# Patient Record
Sex: MALE | Race: BLACK OR AFRICAN AMERICAN | NOT HISPANIC OR LATINO | Employment: FULL TIME | URBAN - METROPOLITAN AREA
[De-identification: names, ages, dates, MRNs, and addresses within clinical notes are randomized per-mention and may not be internally consistent; named-entity substitution may affect disease eponyms.]

---

## 2017-08-29 LAB
BILIRUB SERPL-MCNC: NEGATIVE MG/DL
BLOOD URINE, POC: NORMAL
CLARITY, POC UA: CLEAR
COLOR, POC UA: YELLOW
GLUCOSE UR QL STRIP: NEGATIVE
KETONES UR QL STRIP: NEGATIVE
LEUKOCYTE EST, POC UA: NEGATIVE
NITRITE, POC UA: NEGATIVE
PH, POC UA: 7
PROTEIN, POC: NORMAL
SPECIFIC GRAVITY, POC UA: 1.01
UROBILINOGEN, POC UA: NORMAL

## 2018-02-05 ENCOUNTER — HISTORICAL (OUTPATIENT)
Dept: LAB | Facility: HOSPITAL | Age: 53
End: 2018-02-05

## 2018-02-05 LAB
ABS NEUT (OLG): 3.13 X10(3)/MCL (ref 2.1–9.2)
ALBUMIN SERPL-MCNC: 3.5 GM/DL (ref 3.4–5)
ALBUMIN/GLOB SERPL: 0.9 {RATIO}
ALP SERPL-CCNC: 73 UNIT/L (ref 50–136)
ALT SERPL-CCNC: 21 UNIT/L (ref 12–78)
AST SERPL-CCNC: 13 UNIT/L (ref 15–37)
BASOPHILS # BLD AUTO: 0.1 X10(3)/MCL (ref 0–0.2)
BASOPHILS NFR BLD AUTO: 1 %
BILIRUB SERPL-MCNC: 0.6 MG/DL (ref 0.2–1)
BILIRUBIN DIRECT+TOT PNL SERPL-MCNC: 0.2 MG/DL (ref 0–0.2)
BILIRUBIN DIRECT+TOT PNL SERPL-MCNC: 0.4 MG/DL (ref 0–0.8)
BUN SERPL-MCNC: 6 MG/DL (ref 7–18)
CALCIUM SERPL-MCNC: 8.7 MG/DL (ref 8.5–10.1)
CHLORIDE SERPL-SCNC: 105 MMOL/L (ref 98–107)
CO2 SERPL-SCNC: 30 MMOL/L (ref 21–32)
CREAT SERPL-MCNC: 1.02 MG/DL (ref 0.7–1.3)
EOSINOPHIL # BLD AUTO: 0 X10(3)/MCL (ref 0–0.9)
EOSINOPHIL NFR BLD AUTO: 1 %
ERYTHROCYTE [DISTWIDTH] IN BLOOD BY AUTOMATED COUNT: 14.3 % (ref 11.5–17)
GLOBULIN SER-MCNC: 4.1 GM/DL (ref 2.4–3.5)
GLUCOSE SERPL-MCNC: 150 MG/DL (ref 74–106)
HCT VFR BLD AUTO: 41.6 % (ref 42–52)
HGB BLD-MCNC: 13.1 GM/DL (ref 14–18)
LYMPHOCYTES # BLD AUTO: 1 X10(3)/MCL (ref 0.6–4.6)
LYMPHOCYTES NFR BLD AUTO: 20 %
MCH RBC QN AUTO: 26.4 PG (ref 27–31)
MCHC RBC AUTO-ENTMCNC: 31.5 GM/DL (ref 33–36)
MCV RBC AUTO: 83.7 FL (ref 80–94)
MONOCYTES # BLD AUTO: 0.5 X10(3)/MCL (ref 0.1–1.3)
MONOCYTES NFR BLD AUTO: 10 %
NEUTROPHILS # BLD AUTO: 3.13 X10(3)/MCL (ref 1.4–7.9)
NEUTROPHILS NFR BLD AUTO: 67 %
PLATELET # BLD AUTO: 159 X10(3)/MCL (ref 130–400)
PMV BLD AUTO: 11 FL (ref 9.4–12.4)
POTASSIUM SERPL-SCNC: 3.8 MMOL/L (ref 3.5–5.1)
PROT SERPL-MCNC: 7.6 GM/DL (ref 6.4–8.2)
PSA SERPL-MCNC: 1.31 NG/ML (ref 0–4)
RBC # BLD AUTO: 4.97 X10(6)/MCL (ref 4.7–6.1)
SODIUM SERPL-SCNC: 139 MMOL/L (ref 136–145)
WBC # SPEC AUTO: 4.7 X10(3)/MCL (ref 4.5–11.5)

## 2018-09-03 ENCOUNTER — HOSPITAL ENCOUNTER (OUTPATIENT)
Dept: NUTRITION | Facility: HOSPITAL | Age: 53
End: 2018-09-05
Attending: INTERNAL MEDICINE | Admitting: INTERNAL MEDICINE

## 2018-09-03 LAB
ABS NEUT (OLG): 3.74 X10(3)/MCL (ref 2.1–9.2)
ALBUMIN SERPL-MCNC: 3.5 GM/DL (ref 3.4–5)
ALBUMIN/GLOB SERPL: 0.8 RATIO (ref 1.1–2)
ALP SERPL-CCNC: 266 UNIT/L (ref 50–136)
ALT SERPL-CCNC: 161 UNIT/L (ref 12–78)
APPEARANCE, UA: CLEAR
AST SERPL-CCNC: 81 UNIT/L (ref 15–37)
BACTERIA SPEC CULT: ABNORMAL /HPF
BASOPHILS # BLD AUTO: 0.1 X10(3)/MCL (ref 0–0.2)
BASOPHILS NFR BLD AUTO: 1 %
BILIRUB SERPL-MCNC: 1.1 MG/DL (ref 0.2–1)
BILIRUB UR QL STRIP: NEGATIVE
BILIRUBIN DIRECT+TOT PNL SERPL-MCNC: 0.4 MG/DL (ref 0–0.5)
BILIRUBIN DIRECT+TOT PNL SERPL-MCNC: 0.7 MG/DL (ref 0–0.8)
BUN SERPL-MCNC: 11 MG/DL (ref 7–18)
CALCIUM SERPL-MCNC: 9.5 MG/DL (ref 8.5–10.1)
CHLORIDE SERPL-SCNC: 98 MMOL/L (ref 98–107)
CO2 SERPL-SCNC: 28 MMOL/L (ref 21–32)
COLOR UR: YELLOW
CREAT SERPL-MCNC: 1.22 MG/DL (ref 0.7–1.3)
EOSINOPHIL # BLD AUTO: 0.1 X10(3)/MCL (ref 0–0.9)
EOSINOPHIL NFR BLD AUTO: 1 %
ERYTHROCYTE [DISTWIDTH] IN BLOOD BY AUTOMATED COUNT: 11.7 % (ref 11.5–17)
EST. AVERAGE GLUCOSE BLD GHB EST-MCNC: 292 MG/DL
GLOBULIN SER-MCNC: 4.2 GM/DL (ref 2.4–3.5)
GLUCOSE (UA): ABNORMAL
GLUCOSE SERPL-MCNC: 456 MG/DL (ref 74–106)
HAV IGM SERPL QL IA: NEGATIVE
HBA1C MFR BLD: 11.8 % (ref 4.2–6.3)
HBV CORE IGM SERPL QL IA: NEGATIVE
HBV SURFACE AG SERPL QL IA: NEGATIVE
HCT VFR BLD AUTO: 44.3 % (ref 42–52)
HCV AB SERPL QL IA: NEGATIVE
HEPATITIS PANEL INTERP: NORMAL
HGB BLD-MCNC: 14.5 GM/DL (ref 14–18)
HGB UR QL STRIP: NEGATIVE
KETONES UR QL STRIP: ABNORMAL
LEUKOCYTE ESTERASE UR QL STRIP: NEGATIVE
LYMPHOCYTES # BLD AUTO: 0.8 X10(3)/MCL (ref 0.6–4.6)
LYMPHOCYTES NFR BLD AUTO: 15 %
MCH RBC QN AUTO: 26.9 PG (ref 27–31)
MCHC RBC AUTO-ENTMCNC: 32.7 GM/DL (ref 33–36)
MCV RBC AUTO: 82.2 FL (ref 80–94)
MONOCYTES # BLD AUTO: 0.5 X10(3)/MCL (ref 0.1–1.3)
MONOCYTES NFR BLD AUTO: 10 %
NEUTROPHILS # BLD AUTO: 3.74 X10(3)/MCL (ref 1.4–7.9)
NEUTROPHILS NFR BLD AUTO: 72 %
NITRITE UR QL STRIP: NEGATIVE
PH UR STRIP: 5 [PH] (ref 5–9)
PLATELET # BLD AUTO: 137 X10(3)/MCL (ref 130–400)
PMV BLD AUTO: 13.1 FL (ref 9.4–12.4)
POTASSIUM SERPL-SCNC: 4.2 MMOL/L (ref 3.5–5.1)
PROT SERPL-MCNC: 7.7 GM/DL (ref 6.4–8.2)
PROT UR QL STRIP: NEGATIVE
RBC # BLD AUTO: 5.39 X10(6)/MCL (ref 4.7–6.1)
RBC #/AREA URNS HPF: ABNORMAL /[HPF]
RESTICK: NORMAL
SODIUM SERPL-SCNC: 135 MMOL/L (ref 136–145)
SP GR UR STRIP: 1.03 (ref 1–1.03)
SQUAMOUS EPITHELIAL, UA: ABNORMAL
TROPONIN I SERPL-MCNC: <0.02 NG/ML (ref 0.02–0.49)
UROBILINOGEN UR STRIP-ACNC: 0.2
WBC # SPEC AUTO: 5.2 X10(3)/MCL (ref 4.5–11.5)
WBC #/AREA URNS HPF: ABNORMAL /[HPF]

## 2018-09-04 LAB
ABS NEUT (OLG): 2.25 X10(3)/MCL (ref 2.1–9.2)
BASOPHILS NFR BLD MANUAL: 1 % (ref 0–2)
BUN SERPL-MCNC: 8 MG/DL (ref 7–18)
CALCIUM SERPL-MCNC: 9 MG/DL (ref 8.5–10.1)
CHLORIDE SERPL-SCNC: 106 MMOL/L (ref 98–107)
CO2 SERPL-SCNC: 28 MMOL/L (ref 21–32)
CREAT SERPL-MCNC: 0.84 MG/DL (ref 0.7–1.3)
CREAT/UREA NIT SERPL: 9.5
EOSINOPHIL NFR BLD MANUAL: 6 % (ref 0–8)
ERYTHROCYTE [DISTWIDTH] IN BLOOD BY AUTOMATED COUNT: 11.9 % (ref 11.5–17)
GLUCOSE SERPL-MCNC: 190 MG/DL (ref 74–106)
HCT VFR BLD AUTO: 42.6 % (ref 42–52)
HGB BLD-MCNC: 13.4 GM/DL (ref 14–18)
LYMPHOCYTES NFR BLD MANUAL: 19 % (ref 13–40)
MCH RBC QN AUTO: 26.8 PG (ref 27–31)
MCHC RBC AUTO-ENTMCNC: 31.5 GM/DL (ref 33–36)
MCV RBC AUTO: 85.2 FL (ref 80–94)
MONOCYTES NFR BLD MANUAL: 13 % (ref 2–11)
NEUTROPHILS NFR BLD MANUAL: 61 % (ref 47–80)
PLATELET # BLD AUTO: 115 X10(3)/MCL (ref 130–400)
PLATELET # BLD EST: ABNORMAL 10*3/UL
PMV BLD AUTO: 12.6 FL (ref 7.4–10.4)
POTASSIUM SERPL-SCNC: 3.7 MMOL/L (ref 3.5–5.1)
RBC # BLD AUTO: 5 X10(6)/MCL (ref 4.7–6.1)
SODIUM SERPL-SCNC: 142 MMOL/L (ref 136–145)
WBC # SPEC AUTO: 3.8 X10(3)/MCL (ref 4.5–11.5)

## 2018-09-05 LAB
ALBUMIN SERPL-MCNC: 2.8 GM/DL (ref 3.4–5)
ALBUMIN/GLOB SERPL: 0.8 {RATIO}
ALP SERPL-CCNC: 203 UNIT/L (ref 50–136)
ALT SERPL-CCNC: 105 UNIT/L (ref 12–78)
AST SERPL-CCNC: 53 UNIT/L (ref 15–37)
BILIRUB SERPL-MCNC: 0.8 MG/DL (ref 0.2–1)
BILIRUBIN DIRECT+TOT PNL SERPL-MCNC: 0.3 MG/DL (ref 0–0.2)
BILIRUBIN DIRECT+TOT PNL SERPL-MCNC: 0.5 MG/DL (ref 0–0.8)
BUN SERPL-MCNC: 6 MG/DL (ref 7–18)
CALCIUM SERPL-MCNC: 8.9 MG/DL (ref 8.5–10.1)
CHLORIDE SERPL-SCNC: 107 MMOL/L (ref 98–107)
CO2 SERPL-SCNC: 28 MMOL/L (ref 21–32)
CREAT SERPL-MCNC: 0.74 MG/DL (ref 0.7–1.3)
GGT SERPL-CCNC: 586 UNIT/L (ref 5–85)
GLOBULIN SER-MCNC: 3.4 GM/DL (ref 2.4–3.5)
GLUCOSE SERPL-MCNC: 222 MG/DL (ref 74–106)
POTASSIUM SERPL-SCNC: 3.4 MMOL/L (ref 3.5–5.1)
PROT SERPL-MCNC: 6.2 GM/DL (ref 6.4–8.2)
SODIUM SERPL-SCNC: 141 MMOL/L (ref 136–145)

## 2018-10-05 ENCOUNTER — HISTORICAL (OUTPATIENT)
Dept: LAB | Facility: HOSPITAL | Age: 53
End: 2018-10-05

## 2021-04-23 LAB — SARS-COV-2 RNA RESP QL NAA+PROBE: POSITIVE

## 2021-04-26 ENCOUNTER — HISTORICAL (OUTPATIENT)
Dept: INFECTIOUS DISEASES | Facility: HOSPITAL | Age: 56
End: 2021-04-26

## 2021-10-09 LAB
BILIRUB SERPL-MCNC: NEGATIVE MG/DL
BLOOD URINE, POC: NEGATIVE
CLARITY, POC UA: CLEAR
COLOR, POC UA: YELLOW
KETONES UR QL STRIP: NEGATIVE
LEUKOCYTE EST, POC UA: NEGATIVE
NITRITE, POC UA: NEGATIVE
PH, POC UA: 6.5
PROTEIN, POC: NEGATIVE
SPECIFIC GRAVITY, POC UA: 1.01

## 2021-12-14 LAB
INFLUENZA A ANTIGEN, POC: NEGATIVE
INFLUENZA B ANTIGEN, POC: NEGATIVE
RAPID GROUP A STREP (OHS): NEGATIVE
SARS-COV-2 RNA RESP QL NAA+PROBE: NEGATIVE

## 2022-04-10 ENCOUNTER — HISTORICAL (OUTPATIENT)
Dept: ADMINISTRATIVE | Facility: HOSPITAL | Age: 57
End: 2022-04-10
Payer: COMMERCIAL

## 2022-04-30 VITALS
OXYGEN SATURATION: 96 % | SYSTOLIC BLOOD PRESSURE: 106 MMHG | DIASTOLIC BLOOD PRESSURE: 71 MMHG | BODY MASS INDEX: 30.77 KG/M2 | WEIGHT: 214.94 LBS | HEIGHT: 70 IN

## 2022-05-08 ENCOUNTER — HOSPITAL ENCOUNTER (EMERGENCY)
Facility: HOSPITAL | Age: 57
Discharge: HOME | End: 2022-05-08
Attending: EMERGENCY MEDICINE
Payer: COMMERCIAL

## 2022-05-08 VITALS
RESPIRATION RATE: 16 BRPM | DIASTOLIC BLOOD PRESSURE: 81 MMHG | WEIGHT: 203 LBS | TEMPERATURE: 99 F | HEIGHT: 70 IN | SYSTOLIC BLOOD PRESSURE: 130 MMHG | BODY MASS INDEX: 29.06 KG/M2 | OXYGEN SATURATION: 99 % | HEART RATE: 76 BPM

## 2022-05-08 DIAGNOSIS — R13.10 DYSPHAGIA, UNSPECIFIED TYPE: Primary | ICD-10-CM

## 2022-05-08 PROCEDURE — 25000003 PHARM REV CODE 250: Performed by: PHYSICIAN ASSISTANT

## 2022-05-08 PROCEDURE — 96375 TX/PRO/DX INJ NEW DRUG ADDON: CPT

## 2022-05-08 PROCEDURE — 63600175 PHARM REV CODE 636 W HCPCS: Mod: JG | Performed by: NURSE PRACTITIONER

## 2022-05-08 PROCEDURE — 96374 THER/PROPH/DIAG INJ IV PUSH: CPT

## 2022-05-08 PROCEDURE — 99284 EMERGENCY DEPT VISIT MOD MDM: CPT | Mod: 25

## 2022-05-08 RX ORDER — GLUCAGON 1 MG
1 KIT INJECTION
Status: COMPLETED | OUTPATIENT
Start: 2022-05-08 | End: 2022-05-08

## 2022-05-08 RX ORDER — FAMOTIDINE 10 MG/ML
40 INJECTION INTRAVENOUS
Status: COMPLETED | OUTPATIENT
Start: 2022-05-08 | End: 2022-05-08

## 2022-05-08 RX ADMIN — GLUCAGON HYDROCHLORIDE 1 MG: KIT at 05:05

## 2022-05-08 RX ADMIN — FAMOTIDINE 40 MG: 10 INJECTION, SOLUTION INTRAVENOUS at 06:05

## 2022-05-08 NOTE — ED PROVIDER NOTES
Encounter Date: 5/8/2022       History     Chief Complaint   Patient presents with    Choking     Difficulty swallowing x2 days. Able to swallow secretions, but otherwise unable to keep anything down. States feels like it gets stuck and then it comes back up.  Seen at [facility] and told he may need a scope yesterday.     57 yomale who presents with feeling as though he has something stuck when he tries to eats and drinks. States symptoms started Thursday but then worsened Friday. States he cannot tolerate water or food because it won't go down and throwing it up  He is able to tolerate his own saliva. Patient reports to being seen at  yesterday where placed on acid reflux medication. Did not  or start. Follows with Dr. Trinh but has not had this issue before.            Review of patient's allergies indicates:  No Known Allergies  Past Medical History:   Diagnosis Date    Diabetes mellitus     Pancreatitis      Past Surgical History:   Procedure Laterality Date    CHOLECYSTECTOMY         Social History     Tobacco Use    Smoking status: Never Smoker   Substance Use Topics    Alcohol use: No    Drug use: No     Review of Systems   Constitutional: Negative for chills, fatigue and fever.   HENT: Negative for rhinorrhea and sore throat.    Respiratory: Negative for cough, shortness of breath and wheezing.    Cardiovascular: Negative for chest pain.   Gastrointestinal: Positive for abdominal pain and vomiting. Negative for nausea.   Genitourinary: Negative for dysuria, flank pain, frequency, hematuria and urgency.   Musculoskeletal: Negative for back pain.   Skin: Negative for rash.   Neurological: Negative for dizziness, syncope and weakness.   Hematological: Does not bruise/bleed easily.   All other systems reviewed and are negative.      Physical Exam     Initial Vitals [05/08/22 1424]   BP Pulse Resp Temp SpO2   111/72 84 18 98.6 °F (37 °C) 97 %      MAP       --         Physical  Exam    Nursing note and vitals reviewed.  Constitutional: He appears well-developed and well-nourished. No distress.   HENT:   Head: Normocephalic and atraumatic.   Eyes: Conjunctivae and EOM are normal. Pupils are equal, round, and reactive to light.   Neck: Neck supple.   Normal range of motion.  Cardiovascular: Normal rate, regular rhythm and normal heart sounds.   Pulmonary/Chest: Breath sounds normal. No respiratory distress. He has no wheezes. He has no rhonchi. He has no rales.   Abdominal: Abdomen is soft. Bowel sounds are normal. There is no abdominal tenderness.   Musculoskeletal:         General: Normal range of motion.      Cervical back: Normal range of motion and neck supple.     Neurological: He is alert and oriented to person, place, and time. GCS score is 15. GCS eye subscore is 4. GCS verbal subscore is 5. GCS motor subscore is 6.   Skin: Skin is warm and dry.   Psychiatric: He has a normal mood and affect.         ED Course   Procedures  Labs Reviewed - No data to display       Imaging Results    None          Medications   glucagon (human recombinant) injection 1 mg (1 mg Intravenous Given by Other 5/8/22 1740)   famotidine (PF) injection 40 mg (40 mg Intravenous Given 5/8/22 1805)                 ED Course as of 05/09/22 0054   Sun May 08, 2022   1827 Patient tolerating PO challenge after IV medications. Patient already started on Acid reflux medication yesterday. Will have f/u with Dr. Trinh.  [SL]      ED Course User Index  [SL] JING Fernandez             Clinical Impression:   Final diagnoses:  [R13.10] Dysphagia, unspecified type (Primary)          ED Disposition Condition    Discharge Stable        ED Prescriptions     None        Follow-up Information     Follow up With Specialties Details Why Contact Info    PCP  In 1 week As needed            JING Fernandez  05/09/22 0054

## 2022-05-08 NOTE — FIRST PROVIDER EVALUATION
Medical screening exam completed.  I have conducted a focused provider triage encounter, findings are as follows:    Chief Complaint   Patient presents with    Choking     Difficulty swallowing x2 days. Able to swallow secretions, but otherwise unable to keep anything down. States feels like it gets stuck and then it comes back up.  Seen at [facility] and told he may need a scope yesterday.       Brief history of present illness:  58 y/o male who presents with feeling as though he has something stuck when he tries to eat/drink. Symptoms originally started Thursday but then worsened Friday. States he can't tolerate water or food because it won't go down. He is able to tolerate his own saliva. He does see dr. hodge but has not had this issue before.     There were no vitals filed for this visit.    Pertinent physical exam:  Tolerated his own secretions, alert, no distress, nonlabored respirations, ambulatory steadily    Brief workup plan:  Glucagon IVP    Preliminary workup initiated; this workup will be continued and followed by the physician or advanced practice provider that is assigned to the patient when roomed.

## 2022-06-14 ENCOUNTER — ANESTHESIA EVENT (OUTPATIENT)
Dept: ENDOSCOPY | Facility: HOSPITAL | Age: 57
DRG: 405 | End: 2022-06-14
Payer: COMMERCIAL

## 2022-06-14 ENCOUNTER — ANESTHESIA (OUTPATIENT)
Dept: ENDOSCOPY | Facility: HOSPITAL | Age: 57
DRG: 405 | End: 2022-06-14
Payer: COMMERCIAL

## 2022-06-14 ENCOUNTER — HOSPITAL ENCOUNTER (INPATIENT)
Facility: HOSPITAL | Age: 57
LOS: 6 days | Discharge: HOME | DRG: 405 | End: 2022-06-22
Attending: EMERGENCY MEDICINE | Admitting: INTERNAL MEDICINE
Payer: COMMERCIAL

## 2022-06-14 DIAGNOSIS — R13.19 ESOPHAGEAL DYSPHAGIA: Chronic | ICD-10-CM

## 2022-06-14 DIAGNOSIS — R13.10 DYSPHAGIA, UNSPECIFIED TYPE: ICD-10-CM

## 2022-06-14 DIAGNOSIS — K86.1 OTHER CHRONIC PANCREATITIS: ICD-10-CM

## 2022-06-14 DIAGNOSIS — K86.2 CYST AND PSEUDOCYST OF PANCREAS: ICD-10-CM

## 2022-06-14 DIAGNOSIS — K86.3 CYST AND PSEUDOCYST OF PANCREAS: ICD-10-CM

## 2022-06-14 DIAGNOSIS — E86.0 DEHYDRATION: Primary | ICD-10-CM

## 2022-06-14 LAB
ABS NEUT (OLG): 3.85 X10(3)/MCL (ref 2.1–9.2)
ALBUMIN SERPL-MCNC: 3.8 GM/DL (ref 3.5–5)
ALBUMIN/GLOB SERPL: 0.8 RATIO (ref 1.1–2)
ALP SERPL-CCNC: 62 UNIT/L (ref 40–150)
ALT SERPL-CCNC: 7 UNIT/L (ref 0–55)
APPEARANCE UR: CLEAR
AST SERPL-CCNC: 18 UNIT/L (ref 5–34)
BACTERIA #/AREA URNS AUTO: NORMAL /HPF
BASOPHILS NFR BLD MANUAL: 0.21 X10(3)/MCL (ref 0–0.2)
BASOPHILS NFR BLD MANUAL: 4 %
BILIRUB UR QL STRIP.AUTO: NEGATIVE MG/DL
BILIRUBIN DIRECT+TOT PNL SERPL-MCNC: 1.2 MG/DL
BUN SERPL-MCNC: 15.9 MG/DL (ref 8.4–25.7)
CALCIUM SERPL-MCNC: 9.3 MG/DL (ref 8.4–10.2)
CHLORIDE SERPL-SCNC: 106 MMOL/L (ref 98–107)
CO2 SERPL-SCNC: 19 MMOL/L (ref 22–29)
COLOR UR AUTO: YELLOW
CREAT SERPL-MCNC: 0.86 MG/DL (ref 0.73–1.18)
EOSINOPHIL NFR BLD MANUAL: 0.05 X10(3)/MCL (ref 0–0.9)
EOSINOPHIL NFR BLD MANUAL: 1 %
ERYTHROCYTE [DISTWIDTH] IN BLOOD BY AUTOMATED COUNT: 13.5 % (ref 11.5–17)
GLOBULIN SER-MCNC: 4.8 GM/DL (ref 2.4–3.5)
GLUCOSE SERPL-MCNC: 118 MG/DL (ref 74–100)
GLUCOSE UR QL STRIP.AUTO: ABNORMAL MG/DL
HCT VFR BLD AUTO: 44.4 % (ref 42–52)
HGB BLD-MCNC: 14.4 GM/DL (ref 14–18)
IMM GRANULOCYTES # BLD AUTO: 0.02 X10(3)/MCL (ref 0–0.02)
IMM GRANULOCYTES NFR BLD AUTO: 0.4 % (ref 0–0.43)
INSTRUMENT WBC (OLG): 5.2 X10(3)/MCL
KETONES UR QL STRIP.AUTO: ABNORMAL MG/DL
LEUKOCYTE ESTERASE UR QL STRIP.AUTO: NEGATIVE UNIT/L
LYMPHOCYTES NFR BLD MANUAL: 0.52 X10(3)/MCL
LYMPHOCYTES NFR BLD MANUAL: 10 %
MCH RBC QN AUTO: 26.6 PG (ref 27–31)
MCHC RBC AUTO-ENTMCNC: 32.4 MG/DL (ref 33–36)
MCV RBC AUTO: 82.1 FL (ref 80–94)
MONOCYTES NFR BLD MANUAL: 0.57 X10(3)/MCL (ref 0.1–1.3)
MONOCYTES NFR BLD MANUAL: 11 %
NEUTROPHILS NFR BLD MANUAL: 74 %
NITRITE UR QL STRIP.AUTO: NEGATIVE
NRBC BLD AUTO-RTO: 0 %
PH UR STRIP.AUTO: 5.5 [PH]
PLATELET # BLD AUTO: 203 X10(3)/MCL (ref 130–400)
PLATELET # BLD EST: ADEQUATE 10*3/UL
PMV BLD AUTO: 12.3 FL (ref 9.4–12.4)
POCT GLUCOSE: 101 MG/DL (ref 70–110)
POCT GLUCOSE: 130 MG/DL (ref 70–110)
POTASSIUM SERPL-SCNC: 4.2 MMOL/L (ref 3.5–5.1)
PROT SERPL-MCNC: 8.6 GM/DL (ref 6.4–8.3)
PROT UR QL STRIP.AUTO: ABNORMAL MG/DL
RBC # BLD AUTO: 5.41 X10(6)/MCL (ref 4.7–6.1)
RBC #/AREA URNS AUTO: <5 /HPF
RBC MORPH BLD: NORMAL
RBC UR QL AUTO: NEGATIVE UNIT/L
SODIUM SERPL-SCNC: 138 MMOL/L (ref 136–145)
SP GR UR STRIP.AUTO: 1.02 (ref 1–1.03)
SQUAMOUS #/AREA URNS AUTO: <4 /LPF
UROBILINOGEN UR STRIP-ACNC: 1 MG/DL
WBC # SPEC AUTO: 5.2 X10(3)/MCL (ref 4.5–11.5)
WBC #/AREA URNS AUTO: <5 /HPF

## 2022-06-14 PROCEDURE — 43245 EGD DILATE STRICTURE: CPT | Performed by: INTERNAL MEDICINE

## 2022-06-14 PROCEDURE — 81001 URINALYSIS AUTO W/SCOPE: CPT | Performed by: PHYSICIAN ASSISTANT

## 2022-06-14 PROCEDURE — 85025 COMPLETE CBC W/AUTO DIFF WBC: CPT | Performed by: PHYSICIAN ASSISTANT

## 2022-06-14 PROCEDURE — 25000003 PHARM REV CODE 250: Performed by: ANESTHESIOLOGY

## 2022-06-14 PROCEDURE — 85007 BL SMEAR W/DIFF WBC COUNT: CPT | Performed by: PHYSICIAN ASSISTANT

## 2022-06-14 PROCEDURE — 63600175 PHARM REV CODE 636 W HCPCS: Performed by: PHYSICIAN ASSISTANT

## 2022-06-14 PROCEDURE — G0378 HOSPITAL OBSERVATION PER HR: HCPCS

## 2022-06-14 PROCEDURE — 80053 COMPREHEN METABOLIC PANEL: CPT | Performed by: PHYSICIAN ASSISTANT

## 2022-06-14 PROCEDURE — 25000003 PHARM REV CODE 250: Performed by: PHYSICIAN ASSISTANT

## 2022-06-14 PROCEDURE — 36415 COLL VENOUS BLD VENIPUNCTURE: CPT | Performed by: PHYSICIAN ASSISTANT

## 2022-06-14 PROCEDURE — 25000003 PHARM REV CODE 250: Performed by: EMERGENCY MEDICINE

## 2022-06-14 PROCEDURE — 37000008 HC ANESTHESIA 1ST 15 MINUTES: Performed by: INTERNAL MEDICINE

## 2022-06-14 PROCEDURE — 96374 THER/PROPH/DIAG INJ IV PUSH: CPT

## 2022-06-14 PROCEDURE — 99285 EMERGENCY DEPT VISIT HI MDM: CPT | Mod: 25

## 2022-06-14 PROCEDURE — 37000009 HC ANESTHESIA EA ADD 15 MINS: Performed by: INTERNAL MEDICINE

## 2022-06-14 PROCEDURE — 27201423 OPTIME MED/SURG SUP & DEVICES STERILE SUPPLY: Performed by: INTERNAL MEDICINE

## 2022-06-14 PROCEDURE — 43239 EGD BIOPSY SINGLE/MULTIPLE: CPT | Performed by: INTERNAL MEDICINE

## 2022-06-14 PROCEDURE — 63600175 PHARM REV CODE 636 W HCPCS: Performed by: ANESTHESIOLOGY

## 2022-06-14 RX ORDER — GLUCAGON 1 MG
1 KIT INJECTION
Status: DISCONTINUED | OUTPATIENT
Start: 2022-06-14 | End: 2022-06-22 | Stop reason: HOSPADM

## 2022-06-14 RX ORDER — EMPAGLIFLOZIN 25 MG/1
25 TABLET, FILM COATED ORAL DAILY
COMMUNITY

## 2022-06-14 RX ORDER — PANTOPRAZOLE SODIUM 40 MG/1
40 TABLET, DELAYED RELEASE ORAL DAILY
Status: DISCONTINUED | OUTPATIENT
Start: 2022-06-15 | End: 2022-06-16

## 2022-06-14 RX ORDER — METFORMIN HYDROCHLORIDE 500 MG/1
500 TABLET ORAL 2 TIMES DAILY WITH MEALS
COMMUNITY

## 2022-06-14 RX ORDER — SODIUM CHLORIDE, SODIUM GLUCONATE, SODIUM ACETATE, POTASSIUM CHLORIDE AND MAGNESIUM CHLORIDE 30; 37; 368; 526; 502 MG/100ML; MG/100ML; MG/100ML; MG/100ML; MG/100ML
INJECTION, SOLUTION INTRAVENOUS
Status: DISCONTINUED | OUTPATIENT
Start: 2022-06-14 | End: 2022-06-14

## 2022-06-14 RX ORDER — HYOSCYAMINE SULFATE 0.12 MG/1
0.12 TABLET SUBLINGUAL
Status: COMPLETED | OUTPATIENT
Start: 2022-06-14 | End: 2022-06-14

## 2022-06-14 RX ORDER — SODIUM CHLORIDE, SODIUM LACTATE, POTASSIUM CHLORIDE, CALCIUM CHLORIDE 600; 310; 30; 20 MG/100ML; MG/100ML; MG/100ML; MG/100ML
INJECTION, SOLUTION INTRAVENOUS CONTINUOUS
Status: DISCONTINUED | OUTPATIENT
Start: 2022-06-14 | End: 2022-06-22 | Stop reason: HOSPADM

## 2022-06-14 RX ORDER — INSULIN ASPART 100 [IU]/ML
1-10 INJECTION, SOLUTION INTRAVENOUS; SUBCUTANEOUS
Status: DISCONTINUED | OUTPATIENT
Start: 2022-06-14 | End: 2022-06-22 | Stop reason: HOSPADM

## 2022-06-14 RX ORDER — SUCCINYLCHOLINE CHLORIDE 20 MG/ML
INJECTION INTRAMUSCULAR; INTRAVENOUS
Status: DISCONTINUED | OUTPATIENT
Start: 2022-06-14 | End: 2022-06-14

## 2022-06-14 RX ORDER — ONDANSETRON 2 MG/ML
4 INJECTION INTRAMUSCULAR; INTRAVENOUS
Status: COMPLETED | OUTPATIENT
Start: 2022-06-14 | End: 2022-06-14

## 2022-06-14 RX ORDER — PROPOFOL 10 MG/ML
VIAL (ML) INTRAVENOUS
Status: DISCONTINUED | OUTPATIENT
Start: 2022-06-14 | End: 2022-06-14

## 2022-06-14 RX ORDER — ONDANSETRON 2 MG/ML
INJECTION INTRAMUSCULAR; INTRAVENOUS
Status: DISCONTINUED | OUTPATIENT
Start: 2022-06-14 | End: 2022-06-14

## 2022-06-14 RX ORDER — DEXAMETHASONE SODIUM PHOSPHATE 4 MG/ML
INJECTION, SOLUTION INTRA-ARTICULAR; INTRALESIONAL; INTRAMUSCULAR; INTRAVENOUS; SOFT TISSUE
Status: DISCONTINUED | OUTPATIENT
Start: 2022-06-14 | End: 2022-06-14

## 2022-06-14 RX ORDER — ONDANSETRON 2 MG/ML
4 INJECTION INTRAMUSCULAR; INTRAVENOUS EVERY 8 HOURS PRN
Status: DISCONTINUED | OUTPATIENT
Start: 2022-06-14 | End: 2022-06-22 | Stop reason: HOSPADM

## 2022-06-14 RX ORDER — LIDOCAINE HYDROCHLORIDE 20 MG/ML
INJECTION, SOLUTION INFILTRATION; PERINEURAL
Status: DISCONTINUED | OUTPATIENT
Start: 2022-06-14 | End: 2022-06-14

## 2022-06-14 RX ORDER — GLYCOPYRROLATE 0.2 MG/ML
INJECTION INTRAMUSCULAR; INTRAVENOUS
Status: DISCONTINUED | OUTPATIENT
Start: 2022-06-14 | End: 2022-06-14

## 2022-06-14 RX ADMIN — ONDANSETRON 4 MG: 2 INJECTION INTRAMUSCULAR; INTRAVENOUS at 02:06

## 2022-06-14 RX ADMIN — LIDOCAINE HYDROCHLORIDE 80 MG: 20 INJECTION, SOLUTION INFILTRATION; PERINEURAL at 02:06

## 2022-06-14 RX ADMIN — SODIUM CHLORIDE, POTASSIUM CHLORIDE, SODIUM LACTATE AND CALCIUM CHLORIDE: 600; 310; 30; 20 INJECTION, SOLUTION INTRAVENOUS at 05:06

## 2022-06-14 RX ADMIN — HYOSCYAMINE SULFATE 0.12 MG: 0.12 TABLET ORAL; SUBLINGUAL at 10:06

## 2022-06-14 RX ADMIN — ONDANSETRON 4 MG: 2 INJECTION INTRAMUSCULAR; INTRAVENOUS at 09:06

## 2022-06-14 RX ADMIN — SUCCINYLCHOLINE CHLORIDE 120 MG: 20 INJECTION, SOLUTION INTRAMUSCULAR; INTRAVENOUS at 02:06

## 2022-06-14 RX ADMIN — GLYCOPYRROLATE 0.2 MG: 0.2 INJECTION INTRAMUSCULAR; INTRAVENOUS at 02:06

## 2022-06-14 RX ADMIN — PROPOFOL 250 MG: 10 INJECTION, EMULSION INTRAVENOUS at 02:06

## 2022-06-14 RX ADMIN — SODIUM CHLORIDE 1000 ML: 9 INJECTION, SOLUTION INTRAVENOUS at 09:06

## 2022-06-14 RX ADMIN — DEXAMETHASONE SODIUM PHOSPHATE 4 MG: 4 INJECTION, SOLUTION INTRA-ARTICULAR; INTRALESIONAL; INTRAMUSCULAR; INTRAVENOUS; SOFT TISSUE at 02:06

## 2022-06-14 RX ADMIN — SODIUM CHLORIDE, SODIUM GLUCONATE, SODIUM ACETATE, POTASSIUM CHLORIDE AND MAGNESIUM CHLORIDE 500 ML: 526; 502; 368; 37; 30 INJECTION, SOLUTION INTRAVENOUS at 03:06

## 2022-06-14 NOTE — ED PROVIDER NOTES
"Encounter Date: 2022    SCRIBE #1 NOTE: I, Theresa Schmitt, am scribing for, and in the presence of,  Sheron Gibbs MD. I have scribed the following portions of the note - Other sections scribed: HPI, ROS, and physical examination.       History     Chief Complaint   Patient presents with    Nausea     Complaining of dysphagia, constipations x4 days; also complaining of left lower flank pain; saw PCP yesterday for same     57 y.o. male with a history of pancreatitis and DM presents to ED c/o dysphagia x 4 days. Pt states that the last time he was able to tolerate anything was Saturday (22). He reports that when he attempts to swallow something it feels like "it gets stuck" causing him to vomit. Notes that he is unable to tolerate food, liquids, or secretions. He is now feeling generally weak. Pt reports having similar episode in the past about a month ago, and he was put on Protonix. He is still trying to find a gastroenterologist. Pt was seen by Dr. Trinh in the past, but he is now retired. He states that it has been "years" since he last had a scope done. Pt denies use of tobacco or alcohol.     PCP: Jasmina Matt PA-C    The history is provided by the patient. No  was used.   GI Problem  The other symptoms of the illness do not include fever or dysuria.   The patient states that she believes she is currently not pregnant. Symptoms associated with the illness do not include chills, diaphoresis, constipation, hematuria or back pain. Significant associated medical issues include diabetes.     Review of patient's allergies indicates:  No Known Allergies  Past Medical History:   Diagnosis Date    Diabetes mellitus     Pancreatitis      Past Surgical History:   Procedure Laterality Date    CHOLECYSTECTOMY      PANCREAS SURGERY       Family History   Problem Relation Age of Onset    Abnormal EKG Neg Hx     Abnormal  screen Neg Hx     Achalasia Neg Hx     Achondroplasia " Neg Hx     Acne Neg Hx     Actinic keratosis Neg Hx     Acute lymphoblastic leukemia Neg Hx     Acute myelogenous leukemia Neg Hx     ADD / ADHD Neg Hx     Glendale's disease Neg Hx     Adrenal disorder Neg Hx     Albinism Neg Hx     Alkaptonuria Neg Hx     Allergic rhinitis Neg Hx     Allergies Neg Hx     Allergy (severe) Neg Hx     Alopecia Neg Hx     Alpha-1 antitrypsin deficiency Neg Hx     Alport syndrome Neg Hx     ALS Neg Hx     Alzheimer's disease Neg Hx     Ambiguous genitalia Neg Hx     Amblyopia Neg Hx     Amenorrhea Neg Hx     Anal fissures Neg Hx     Anemia Neg Hx     Anesthesia problems Neg Hx     Aneurysm Neg Hx     Angelman syndrome Neg Hx     Angina Neg Hx     Angioedema Neg Hx     Ankylosing spondylitis Neg Hx     Anorectal malformation Neg Hx     Anorexia nervosa Neg Hx     Anti-cardiolipin syndrome Neg Hx     Antithrombin III deficiency Neg Hx     Anuerysm Neg Hx     Anxiety disorder Neg Hx     Aortic aneurysm Neg Hx     Aortic dissection Neg Hx     Aortic stenosis Neg Hx     Aphthous stomatitis Neg Hx     Aplastic anemia Neg Hx     Appendicitis Neg Hx     Apraxia Neg Hx     Arnold-Chiari malformation Neg Hx     Arrhythmia Neg Hx     Asbestos Neg Hx     Asperger's syndrome Neg Hx     Ataxia Neg Hx     Ataxia telangiectasia Neg Hx     Atopy Neg Hx     Atrial fibrillation Neg Hx     Atrophic kidney Neg Hx     Auditory processing disorder Neg Hx     Autism Neg Hx     Autism spectrum disorder Neg Hx     Autoimmune disease Neg Hx     AVM Neg Hx     Baker's cyst Neg Hx     Bauer's esophagus Neg Hx     Bartter's syndrome Neg Hx     Basal cell carcinoma Neg Hx     Behavior problems Neg Hx     Bell's palsy Neg Hx     Benign prostatic hyperplasia Neg Hx     Bipolar disorder Neg Hx     Birth defects Neg Hx     Birth marks Neg Hx     Blindness Neg Hx     Bone cancer Neg Hx     BOR syndrome Neg Hx     Cleveland legs Neg Hx     Bradycardia Neg Hx      Brain cancer Neg Hx     BRCA 1/2 Neg Hx     Breast cancer Neg Hx     Broken bones Neg Hx     Bronchiolitis Neg Hx     Bronchopulmonary dysplasia Neg Hx     Bruton's disease Neg Hx     Bulemia Neg Hx     Bullous pemphigoid Neg Hx     Bunion Neg Hx     Bursitis Neg Hx     Café-au-lait spots Neg Hx     Calcium disorder Neg Hx     Canavan disease Neg Hx     Cancer Neg Hx     Cardiomyopathy Neg Hx     Carpal tunnel syndrome Neg Hx     Cataracts Neg Hx     Celiac disease Neg Hx     Cerebral aneurysm Neg Hx     Cerebral palsy Neg Hx     Cervical cancer Neg Hx     Cervical polyp Neg Hx     Cervicitis Neg Hx     Chalasia Neg Hx     Chalazion Neg Hx     Charcot-Mirtha-Tooth disease Neg Hx     Chediak-Higashi syndrome Neg Hx     Chiari malformation Neg Hx     Childhood respiratory disease Neg Hx     Choanal atresia Neg Hx     Cholecystitis Neg Hx     Cholelithiasis Neg Hx     Cholesteatoma Neg Hx     Chondromalacia Neg Hx     Chorea Neg Hx     Chromosomal disorder Neg Hx     Chronic back pain Neg Hx     Chronic bronchitis Neg Hx     Chronic fatigue Neg Hx     Chronic granulomatous disease Neg Hx     Chronic infections Neg Hx     Cirrhosis Neg Hx     Cleft lip Neg Hx     Cleft palate Neg Hx     Clotting disorder Neg Hx     Club foot Neg Hx     Coarctation of the aorta Neg Hx     Collagen disease Neg Hx     Colon cancer Neg Hx     Colon polyps Neg Hx     Colonic polyp Neg Hx     Color blindness Neg Hx     Conduct disorder Neg Hx     Conductive hearing loss Neg Hx     Congenital adrenal hyperplasia Neg Hx     Congenital heart disease Neg Hx     Conjunctivitis Neg Hx     Consanguinity Neg Hx     Constipation Neg Hx     COPD Neg Hx     Corneal abrasion Neg Hx     Corneal ulcer Neg Hx     Coronary aneurysm Neg Hx     Coronary artery disease Neg Hx     Cowden syndrome Neg Hx     Craniosynostosis Neg Hx     Cri-du-chat syndrome Neg Hx     Crohn's disease Neg Hx      Cushing syndrome Neg Hx     Cystic fibrosis Neg Hx     Cystic kidney disease Neg Hx     Cystinosis Neg Hx     Decreased libido Neg Hx     Deep vein thrombosis Neg Hx     Delayed menopause Neg Hx     Delayed puberty Neg Hx     Dementia Neg Hx     Dental caries Neg Hx     Depression Neg Hx     Dermatomyositis Neg Hx     JENNIFER usage Neg Hx     Developmental delay Neg Hx     Diabetes insipidus Neg Hx     Diabetes Mellitus Neg Hx     Diabetes type I Neg Hx     Diabetes type II Neg Hx     Diabetic kidney disease Neg Hx     DiGeorge syndrome Neg Hx     Dilated cardiomyopathy Neg Hx     Dislocations Neg Hx     Diverticulitis Neg Hx     Diverticulosis Neg Hx     Down syndrome Neg Hx     Drug abuse Neg Hx     Dupuytren's contracture Neg Hx     Dwarfism Neg Hx     Dysfunctional uterine bleeding Neg Hx     Dysmenorrhea Neg Hx     Dyspareunia Neg Hx     Dysphagia Neg Hx     Dysrhythmia Neg Hx     Dystonia Neg Hx     Early death Neg Hx     Early menopause Neg Hx     Early puberty Neg Hx     Eating disorder Neg Hx     Eclampsia Neg Hx     Ectodermal dysplasia Neg Hx     Eczema Neg Hx     Edema Neg Hx     Edward's syndrome Neg Hx     Amarjit-Danlos syndrome Neg Hx     Emotional abuse Neg Hx     Emphysema Neg Hx     Encopresis Neg Hx     Endocrine tumor Neg Hx     Endocrinopathy Neg Hx     Endometrial cancer Neg Hx     Endometriosis Neg Hx     Enuresis Neg Hx     Eosinophilic granuloma Neg Hx     Epididymitis Neg Hx     Erectile dysfunction Neg Hx     Erythema nodosum Neg Hx     Esophageal cancer Neg Hx     Esophageal varices Neg Hx     Esophagitis Neg Hx     Essential Tremor Neg Hx     Exostosis Neg Hx     Fabry's disease Neg Hx     Factor IX deficiency Neg Hx     Factor V Leiden deficiency Neg Hx     Factor VIII deficiency Neg Hx     Failure to thrive Neg Hx     Fainting Neg Hx     Familial dysautonomia Neg Hx     Familial nephritis Neg Hx     Familial polyposis  Neg Hx     Fanconi anemia Neg Hx     Febrile seizures Neg Hx     Fibrocystic breast disease Neg Hx     Fibroids Neg Hx     Fibromyalgia Neg Hx     Food intolerance Neg Hx     Fragile X syndrome Neg Hx     Friedreich's ataxia Neg Hx     Frontotemporal dementia Neg Hx     Fuch's dystrophy Neg Hx     Gait disorder Neg Hx     Galactosemia Neg Hx     Gallbladder disease Neg Hx     Gaucher's disease Neg Hx     Genodermatoses Neg Hx     WILMER disease Neg Hx     Gestational diabetes Neg Hx     GI problems Neg Hx     Glaucoma Neg Hx     Glomerulonephritis Neg Hx     Glucose-6-phos deficiency Neg Hx     Glycogen storage disease Neg Hx     Goiter Neg Hx     Gonadal disorder Neg Hx     Gout Neg Hx     Graves' disease Neg Hx     Growth hormone deficiency Neg Hx      problems Neg Hx     Hammer toes Neg Hx     Hartnup's disease Neg Hx     Hashimoto's thyroiditis Neg Hx     Hearing loss Neg Hx     Heart attack Neg Hx     Heart block Neg Hx     Heart defect Neg Hx     Heart disease Neg Hx     Heart failure Neg Hx     Heart murmur Neg Hx     Hemangiomas Neg Hx     Hematuria Neg Hx     Hemochromatosis Neg Hx     Hemolytic uremic syndrome Neg Hx     Hemophilia Neg Hx     Henoch-Schonlein purpura Neg Hx     Hepatitis Neg Hx     Hepatomegaly Neg Hx     Hereditary spherocytosis Neg Hx     Hernia Neg Hx     Hiatal hernia Neg Hx     High arches Neg Hx     Hip dysplasia Neg Hx     Hip fracture Neg Hx     Hirschsprung's disease Neg Hx     Hirsutism Neg Hx     Histiocytosis X Neg Hx     HIV Neg Hx     HLA-B27 positive Neg Hx     Hodgkin's lymphoma Neg Hx     Homocystinuria Neg Hx     Ilir's disease Neg Hx     Andersonville's disease Neg Hx     Hydrocele Neg Hx     Hydrocephalus Neg Hx     Hygroma Neg Hx     Hypercalcemia Neg Hx     Hypereosinophilic syndrome Neg Hx     Hyperinsulinemia Neg Hx     Hyperkalemia Neg Hx     Hyperlipidemia Neg Hx     Hypermobility Neg Hx      Hypernasality Neg Hx     Hyperopia Neg Hx     Hyperparathyroidism Neg Hx     Hypersomnolence Neg Hx     Hypertension Neg Hx     Hyperthyroidism Neg Hx     Hypertrophic cardiomyopathy Neg Hx     Hypoglycemic Neg Hx     Hypokalemia Neg Hx     Hypoparathyroidism Neg Hx     Hypoplastic kidney Neg Hx     Hypotension Neg Hx     Hypothyroidism Neg Hx     Idiopathic pulmonary fibrosis Neg Hx     Idiopathic torsion dystonia Neg Hx     IgA nephropathy Neg Hx     Immunodeficiency Neg Hx     Imperforate anus Neg Hx     Impotence Neg Hx     Impulse control disorder Neg Hx     Incompetent cervix Neg Hx     Infertility Neg Hx     Inflammatory bowel disease Neg Hx     Inguinal hernia Neg Hx     Insomnia Neg Hx     Insulin resistance Neg Hx     Interstitial cystitis Neg Hx     Intestinal malrotation Neg Hx     Intestinal polyp Neg Hx     Intracerebral hemorrhage Neg Hx     Iron deficiency Neg Hx     Irregular heart beat Neg Hx     Irritable bowel syndrome Neg Hx     Jaundice Neg Hx     Job's syndrome Neg Hx     Joint hypermobility Neg Hx     Juvenile idiopathic arthritis Neg Hx     Kartagener's syndrome Neg Hx     Kawasaki disease Neg Hx     Keloids Neg Hx     Chance's disease Neg Hx     Keratoconus Neg Hx     Kidney cancer Neg Hx     Kidney disease Neg Hx     Kidney failure Neg Hx     Kidney nephrosis Neg Hx     Klinefelter's syndrome Neg Hx     Krabbe disease Neg Hx     Kyphosis Neg Hx     Labyrinthitis Neg Hx     Lactose intolerance Neg Hx     Language disorder Neg Hx     Lead poisoning Neg Hx     Learning disabilities Neg Hx     Rquo-Swosl-Bforiwn disease Neg Hx     Lesch-Nyhan syndrome Neg Hx     Leukemia Neg Hx     Lichen planus Neg Hx     Li-Fraumeni syndrome Neg Hx     Liver cancer Neg Hx     Liver disease Neg Hx     Long QT syndrome Neg Hx     Lumbar disc disease Neg Hx     Lung cancer Neg Hx     Lung disease Neg Hx     Lupus Neg Hx     Lymphoma Neg Hx      Macrocephaly Neg Hx     Macrosomia Neg Hx     Macular degeneration Neg Hx     Malignant hypertension Neg Hx     Malignant hyperthermia Neg Hx     Maple syrup urine disease Neg Hx     Marfan syndrome Neg Hx     Mastocytosis Neg Hx     Melanoma Neg Hx     Memory loss Neg Hx     Meniere's disease Neg Hx     Meningitis Neg Hx     Menstrual problems Neg Hx     Mental illness Neg Hx     Mental retardation Neg Hx     Metabolic syndrome Neg Hx     Microcephaly Neg Hx     Migraines Neg Hx     Milk intolerance Neg Hx     Miscarriages / Stillbirths Neg Hx     Mitochondrial disorder Neg Hx     Mitral valve prolapse Neg Hx     Motor neuron disease Neg Hx     Movement disorder Neg Hx     Moyamoya disease Neg Hx     Multiple births Neg Hx     Multiple endocrine neoplasia Neg Hx     Multiple fractures Neg Hx     Multiple myeloma Neg Hx     Multiple sclerosis Neg Hx     Muscle cancer Neg Hx     Muscular dystrophy Neg Hx     Myasthenia gravis Neg Hx     Myelodysplastic syndrome Neg Hx     Myocarditis Neg Hx     Myoclonus Neg Hx     Myopathy Neg Hx     Nail disease Neg Hx     Narcolepsy Neg Hx     Nephritis Neg Hx     Nephrolithiasis Neg Hx     Nephrotic syndrome Neg Hx     Neural tube defect Neg Hx     Neurodegenerative disease Neg Hx     Neurofibromatosis Neg Hx     Neuromuscular disorder Neg Hx     Neuropathy Neg Hx     Neutropenia Neg Hx     Nevi Neg Hx     Azucena-Pick disease Neg Hx     Night blindness Neg Hx     Nocturnal enuresis Neg Hx     Nystagmus Neg Hx     Obesity Neg Hx     OCD Neg Hx     ODD Neg Hx     Orchitis Neg Hx     Osler-Weber-Rendu syndrome Neg Hx     Osteoarthritis Neg Hx     Osteochondroma Neg Hx     Osteogenesis imperfecta Neg Hx     Osteopenia Neg Hx     Osteoporosis Neg Hx     Osteosarcoma Neg Hx     Osteosclerosis Neg Hx     Other Neg Hx     Otitis media Neg Hx     Ovarian cancer Neg Hx     Ovarian cysts Neg Hx     Oxalosis Neg Hx      Paget's disease of bone Neg Hx     Pancreatic cancer Neg Hx     Pancreatitis Neg Hx     Panhypopituitarism Neg Hx     Panic disorder Neg Hx     Paranoid behavior Neg Hx     Parasomnia Neg Hx     Parkinsonism Neg Hx     Patau's syndrome Neg Hx     Pathological gambling Neg Hx     PDD Neg Hx     Pectus carinatum Neg Hx     Pectus excavatum Neg Hx     Pelvic inflammatory disease Neg Hx     Pemphigus vulgaris Neg Hx     Penile cancer Neg Hx     Periodic limb movement Neg Hx     Peripheral vascular disease Neg Hx     Pernicious anemia Neg Hx     Personality disorder Neg Hx     Pes cavus Neg Hx     Physical abuse Neg Hx     Aiden Regan sequence Neg Hx     PKU Neg Hx     Plantar fasciitis Neg Hx     Pleurisy Neg Hx     Pneumonia Neg Hx     Polychondritis Neg Hx     Polycystic kidney disease Neg Hx     Polycystic ovary syndrome Neg Hx     Polycythemia Neg Hx     Polymyalgia rheumatica Neg Hx     Polymyositis Neg Hx     Pompe disease Neg Hx     Posterior urethral valves Neg Hx     Post-traumatic stress disorder Neg Hx     Prader-Willi syndrome Neg Hx     Premature birth Neg Hx     Premature ovarian failure Neg Hx      labor Neg Hx     Prolactinoma Neg Hx     Prostate cancer Neg Hx     Prostatitis Neg Hx     Protein C deficiency Neg Hx     Protein S deficiency Neg Hx     Proteinuria Neg Hx     Prune belly syndrome Neg Hx     Pruritis Neg Hx     Pseudochol deficiency Neg Hx     Pseudotumor cerebri Neg Hx     Psoriasis Neg Hx     Psychosis Neg Hx     Ptosis Neg Hx     Pulmonary embolism Neg Hx     Pulmonary fibrosis Neg Hx     Pyelonephritis Neg Hx     Pyloric stenosis Neg Hx     Pyruvate dehydrogenase deficiency Neg Hx     Rashes / Skin problems Neg Hx     Raynaud syndrome Neg Hx     Rectal cancer Neg Hx     Recurrent abdominal pain Neg Hx     Lalita's syndrome Neg Hx     Renal tubular acidosis Neg Hx     Restless legs syndrome Neg Hx     Retinal  degeneration Neg Hx     Retinal detachment Neg Hx     Retinitis pigmentosa Neg Hx     Retinoblastoma Neg Hx     Retinopathy of prematurity Neg Hx     Reye's syndrome Neg Hx     Rheum arthritis Neg Hx     Rheumatic fever Neg Hx     Rheumatologic disease Neg Hx     Rhinitis Neg Hx     Rickets Neg Hx     Rosacea Neg Hx     Sacroiliitis Neg Hx     Sarcoidosis Neg Hx     Schizophrenia Neg Hx     Scleritis Neg Hx     Scleroderma Neg Hx     Scoliosis Neg Hx     Seasonal affective disorder Neg Hx     Seizures Neg Hx     Selective mutism Neg Hx     Sensorineural hearing loss Neg Hx     Severe combined immunodeficiency Neg Hx     Severe sprains Neg Hx     Sexual abuse Neg Hx     Short stature Neg Hx     Sick sinus syndrome Neg Hx     Sickle cell anemia Neg Hx     Sickle cell trait Neg Hx     SIDS Neg Hx     Single kidney Neg Hx     Sinusitis Neg Hx     Sjogren's syndrome Neg Hx     Skeletal dysplasia Neg Hx     Skin cancer Neg Hx     Skin telangiectasia Neg Hx     Sleep apnea Neg Hx     Sleep disorder Neg Hx     Sleep walking Neg Hx     Snoring Neg Hx     Social phobia Neg Hx     Speech disorder Neg Hx     Spina bifida Neg Hx     Spinal muscular atrophy Neg Hx     Splenomegaly Neg Hx     Spondyloarthropathy Neg Hx     Spondylolisthesis Neg Hx     Spondylolysis Neg Hx     Squamous cell carcinoma Neg Hx     Anthony-Oskar syndrome Neg Hx     Stickler syndrome Neg Hx     Stomach cancer Neg Hx     Strabismus Neg Hx     Stroke Neg Hx     Stuttering Neg Hx     Subarachnoid hemorrhage Neg Hx     Sudden death Neg Hx     Suicidality Neg Hx     Supraventricular tachycardia Neg Hx     Swallowing difficulties Neg Hx     Tall stature Neg Hx     Gary-Sachs disease Neg Hx     Testicular cancer Neg Hx     Thalassemia Neg Hx     Throat cancer Neg Hx     Thrombocytopenia Neg Hx     Thrombophilia Neg Hx     Thrombophlebitis Neg Hx     Thrombosis Neg Hx     Thymic aplasia Neg  Hx     Thyroid cancer Neg Hx     Thyroid disease Neg Hx     Thyroid nodules Neg Hx     Tics Neg Hx     Tongue cancer Neg Hx     Torticollis Neg Hx     Tourette syndrome Neg Hx     Tracheal cancer Neg Hx     Tracheoesophageal fistual Neg Hx     Tracheomalacia Neg Hx     Transient ischemic attack Neg Hx     Treacher Adams syndrome Neg Hx     Tremor Neg Hx     Tuberculosis Neg Hx     Tuberous sclerosis Neg Hx     Kim syndrome Neg Hx     Ulcerative colitis Neg Hx     Ulcers Neg Hx     Undescended testes Neg Hx     Unexplained death Neg Hx     Urinary tract infection Neg Hx     Urolithiasis Neg Hx     Urologic Abnormality Neg Hx     Urticaria Neg Hx     Uterine cancer Neg Hx     Uveitis Neg Hx     Vaginal cancer Neg Hx     Valvular heart disease Neg Hx     Vasculitis Neg Hx     Velocardiofacial syndrome Neg Hx     Venous thrombosis Neg Hx     Verruca vulgaris  Neg Hx     Vesicoureteral reflux Neg Hx     Vision loss Neg Hx     Vitamin D deficiency Neg Hx     Vitiligo Neg Hx     Voice disorder Neg Hx     Von Gierke's disease Neg Hx     Von Hippel-Lindau syndrome Neg Hx     Von Willebrand disease Neg Hx     Werdnig Stevens Disease Neg Hx     Kimo syndrome Neg Hx     Wilm's tumor Neg Hx     Valentin's disease Neg Hx     Wiskott-Dagmar syndrome Neg Hx     Oswaldo Parkinson White syndrome Neg Hx     Xeroderma pigmentosa Neg Hx      Social History     Tobacco Use    Smoking status: Never Smoker    Smokeless tobacco: Never Used   Substance Use Topics    Alcohol use: No    Drug use: No     Review of Systems   Constitutional: Negative for chills, diaphoresis and fever.   HENT: Positive for trouble swallowing. Negative for congestion and sinus pain.    Eyes: Negative for pain, discharge and visual disturbance.   Respiratory: Negative for wheezing and stridor.    Cardiovascular: Negative for palpitations.   Gastrointestinal: Negative for constipation and rectal pain.    Genitourinary: Negative for dysuria and hematuria.   Musculoskeletal: Negative for back pain and myalgias.   Neurological: Negative for dizziness, syncope and numbness.   Hematological: Negative.    Psychiatric/Behavioral: Negative.    All other systems reviewed and are negative.      Physical Exam     Initial Vitals [06/14/22 0909]   BP Pulse Resp Temp SpO2   120/77 85 18 98.1 °F (36.7 °C) 97 %      MAP       --         Physical Exam    Nursing note and vitals reviewed.  Constitutional: He appears well-developed and well-nourished. He is not diaphoretic. No distress.   HENT:   Head: Normocephalic and atraumatic.   Nose: Nose normal.   Mouth/Throat: Oropharynx is clear and moist.   Seems to be tolerating secretions. Slightly dry oral mucosa.   Eyes: Conjunctivae and EOM are normal. Pupils are equal, round, and reactive to light.   Neck: Trachea normal. Neck supple.   Normal range of motion.  Cardiovascular: Normal rate, regular rhythm, normal heart sounds and intact distal pulses. Exam reveals no gallop and no friction rub.    No murmur heard.  Pulmonary/Chest: Breath sounds normal. No respiratory distress. He has no wheezes. He has no rhonchi. He has no rales. He exhibits no tenderness.   Abdominal: Abdomen is soft. Bowel sounds are normal. He exhibits no distension and no mass. There is no abdominal tenderness. There is no rebound.   Musculoskeletal:         General: No tenderness or edema. Normal range of motion.      Cervical back: Normal range of motion and neck supple.      Lumbar back: Normal. No tenderness. Normal range of motion.     Neurological: He is alert and oriented to person, place, and time. He has normal strength. No cranial nerve deficit or sensory deficit.   Skin: Skin is warm and dry. Capillary refill takes less than 2 seconds. No rash and no abscess noted. No erythema. No pallor.   Psychiatric: He has a normal mood and affect. His behavior is normal. Judgment and thought content normal.          ED Course   Procedures  Labs Reviewed   COMPREHENSIVE METABOLIC PANEL - Abnormal; Notable for the following components:       Result Value    Carbon Dioxide 19 (*)     Glucose Level 118 (*)     Protein Total 8.6 (*)     Globulin 4.8 (*)     Albumin/Globulin Ratio 0.8 (*)     All other components within normal limits   CBC WITH DIFFERENTIAL - Abnormal; Notable for the following components:    MCH 26.6 (*)     MCHC 32.4 (*)     IG# 0.02 (*)     All other components within normal limits   MANUAL DIFFERENTIAL - Abnormal; Notable for the following components:    Abs Baso 0.208 (*)     Abs Lymp 0.52 (*)     All other components within normal limits   CBC W/ AUTO DIFFERENTIAL    Narrative:     The following orders were created for panel order CBC auto differential.  Procedure                               Abnormality         Status                     ---------                               -----------         ------                     CBC with Differential[[id number]]        Abnormal            Final result               Manual Differential[[id number]]          Abnormal            Final result                 Please view results for these tests on the individual orders.          Imaging Results          CT Abdomen Pelvis With Contrast (Final result)  Result time 06/15/22 16:10:55    Final result by Blair Rodriguez MD (06/15/22 16:10:55)                 Impression:      1.  Collection extending from the pancreatic tail superiorly through the diaphragmatic hiatus significantly larger compared to 2019.  This may communicate with the main pancreatic duct.    2.  Left peritoneal peripherally calcified collection stable going back to 2019.      Electronically signed by: Blair Rodriguez  Date:    06/15/2022  Time:    16:10             Narrative:    EXAMINATION:  CT ABDOMEN PELVIS WITH CONTRAST    CLINICAL HISTORY:  Abdominal abscess/infection suspected;Pancreatic mass/pseudocyst;    TECHNIQUE:  Helical acquisition through  the abdomen and pelvis with IV and enteric contrast.  Three plane reconstructions were provided for review.  mGycm. Automatic exposure control, adjustment of mA/kV or iterative reconstruction technique was used to reduce radiation.    COMPARISON:  14 June 2022 19 November 2019    FINDINGS:  There is a small left pleural effusion.  There is left basilar atelectasis.    There is organized collection extending superiorly from the pancreatic tail through the diaphragmatic hiatus around the distal esophagus.  This measures approximately 10 x 8 x 6 cm.  The largest component is medial to the gastric fundus.  Collection appears to be septated superiorly.  It is possible this communicates with the main pancreatic duct.  Collection present going back to 2019 but is significantly larger.  The pancreatic body is irregular and atrophic.  Main pancreatic duct is dilated in the tail.  Similar irregularity of the pancreatic fat compared to 2019 likely sequela of chronic inflammation.    Minimal intrahepatic biliary ductal dilatation in the left lobe.  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  The gallbladder is surgically absent.  No significant abnormality of the spleen, adrenals or kidneys.    There is no bowel obstruction.  No significant inflammatory changes of the bowel.  There is a stable peripherally calcified peritoneal collection left abdomen image 38 series 8 measuring up to almost 7 cm.    The urinary bladder is unremarkable.  No pelvic free fluid.  The abdominal aorta is normal in caliber.  No retroperitoneal adenopathy.    Mild degenerative changes of the spine.  No acute osseous findings.                               CT Chest Abdomen Without Contrast (XPD) (Final result)  Result time 06/14/22 18:48:21    Final result by Dmitriy Carnes MD (06/14/22 18:48:21)                 Impression:      Cicatrizing appearance of the pancreas with multiloculated cystic structure  extending from the pancreas to the GE junction within overall measurement of approximately 11 by 8 cm.  There is additional peripherally calcified cystic lesion adjacent to the small bowel within the left upper quadrant measuring 7 cm.  Recommend comparison to prior imaging.  Findings believed related to prior pancreatic surgery.  Of note contrast is noted in the stomach.  The contrast is dense within the esophagus.  Findings likely represent partial obstruction at the GE junction.      Electronically signed by: Dmitriy Carnes  Date:    06/14/2022  Time:    18:48             Narrative:    EXAMINATION:  CT CHEST ABDOMEN WITHOUT CONTRAST (XPD)    CLINICAL HISTORY:  external compression at GE junction resulting in dysphagia; concern for pancreatic etiologist/cyst;    TECHNIQUE:  Axial images of the chest, abdomen, and pelvis were obtained without contrast. Sagittal and coronal reconstructed images were available for review.    Automatic exposure control was utilized to reduce the patient's radiation dose.    DLP = 1170    COMPARISON:  No prior images available for comparison.    FINDINGS:  AORTA: Limited evaluation secondary to lack of IV contrast.  Grossly normal in course and caliber.    HEART: Normal size. No pericardial effusion.    THYROID GLAND: The thyroid is not enlarged. There are no nodules identified.    AIRWAYS: Trachea is midline and tracheobronchial tree is patent.    LUNGS: Trace subsegmental atelectatic changes at the left base with small left effusion.    THROACIC LYMPH NODES: There is no significant mediastinal, axillary or hilar lymphadenopathy.    HEPATOBILIARY: No focal hepatic lesion is identified, status post cholecystectomy.    SPLEEN: Normal    PANCREAS: Cicatrizing appearance of the pancreas with multiloculated cystic structure extending from the pancreas to the GE junction within overall measurement of approximately 11 by 8 cm.  There is additional peripherally calcified cystic lesion  adjacent to the small bowel within the left upper quadrant measuring 7 cm.    ADRENALS: No adrenal nodules.    KIDNEYS: The right kidney demonstrates no stone, hydronephrosis, or hydroureter. No focal mass identified. The left kidney demonstrates no stone, hydronephrosis, or hydroureter. No focal mass identified.    ABDOMINAL LYMPHADENOPATHY/RETROPERITONEUM: There is no retroperitoneal lymphadenopathy.    BOWEL: No acute bowel related abnormalities. No evidence of appendiceal inflammation.    PELVIC VISCERA: Normal. No pelvic mass.    PELVIC LYMPH NODES: No lymphadenopathy.    PERITONEUM/ BODY WALL: No ascites or implant.  Drainage catheter is noted within the upper abdomen.    SKELETAL: No aggressive appearing lytic/blastic lesion. No acute fractures, subluxations or dislocations.                                 Medications   dextrose 50% injection 12.5 g (has no administration in time range)   dextrose 50% injection 25 g (has no administration in time range)   glucagon (human recombinant) injection 1 mg (has no administration in time range)   insulin aspart U-100 injection 1-10 Units (has no administration in time range)   ondansetron injection 4 mg (4 mg Intravenous Given 6/15/22 1402)   lactated ringers infusion ( Intravenous New Bag 6/16/22 0918)   pantoprazole injection 40 mg (40 mg Intravenous Given 6/16/22 1415)   sodium chloride 0.9% bolus 1,000 mL (1,000 mLs Intravenous New Bag 6/14/22 0915)   ondansetron injection 4 mg (4 mg Intravenous Given 6/14/22 0915)   hyoscyamine ODT 0.125 mg (0.125 mg Oral Given 6/14/22 1006)   iopamidoL (ISOVUE-370) injection 100 mL (100 mLs Intravenous Given 6/15/22 1547)   diatrizoate meglumineand-diatrizoate sodium (GASTROVIEW) solution 30 mL (30 mLs Oral Given 6/15/22 1547)     Medical Decision Making:   History:   Old Medical Records: I decided to obtain old medical records.  Old Records Summarized: records from previous admission(s).  Differential Diagnosis:   Dysphagia,  food bolus, dehydration  Clinical Tests:   Lab Tests: Ordered and Reviewed  ED Management:  Levsin, zofra, ivf  Other:   I have discussed this case with another health care provider.  Pt to go to gi lab          Scribe Attestation:   Scribe #1: I performed the above scribed service and the documentation accurately describes the services I performed. I attest to the accuracy of the note.  Comments: Attending:   Physician Attestation Statement for Scribe #1: I, Sheron Gibbs MD, personally performed the services described in this documentation. All medical record entries made by the scribe were at my direction and in my presence.  I have reviewed the chart and agree that the record reflects my personal performance and is accurate and complete.      Attending Attestation:           Physician Attestation for Scribe:  Physician Attestation Statement for Scribe #1: I, Sheron Gibbs MD, reviewed documentation, as scribed by Theresa Schmitt in my presence, and it is both accurate and complete.             ED Course as of 06/16/22 1453   Tue Jun 14, 2022   1138 Reports feeling better will try po [BS]   1213 Unable to tolerate po, will discuss with gi to see if pt can be admitetd for scope and possible dilation [BS]   1226 Pt is no longer a patient of Dr. Trinh's clinic, requests calling on call gi [BS]   1247 Spoke with NP with GI and they will add him on for EGD [BS]   1522 Discussed with Jasmin, recommends admission to medicine.  Discussed with Tee, [facility] medicine will admit.  [RP]      ED Course User Index  [BS] Sheron Gibbs MD  [RP] Singh King MD             Clinical Impression:   Final diagnoses:  [E86.0] Dehydration (Primary)  [R13.10] Dysphagia, unspecified type          ED Disposition Condition    Observation               Sheron Gibbs MD  06/14/22 1252       Sheron Gibbs MD  06/16/22 2973

## 2022-06-14 NOTE — FIRST PROVIDER EVALUATION
"Medical screening exam completed.  I have conducted a focused provider triage encounter, findings are as follows:    Brief history of present illness:  Presents to ED for evaluation of not being able to keep food down. Patient states that he eats or then food get stuck causing him to vomit.  Patient reports to constipation since Friday. Complains of left back pain. Saw PCP yesterday and awaiting GI appt.     Vitals:    06/14/22 0909   BP: 120/77   Pulse: 85   Resp: 18   Temp: 98.1 °F (36.7 °C)   TempSrc: Oral   SpO2: 97%   Weight: 93.9 kg (207 lb)   Height: 5' 10" (1.778 m)       Pertinent physical exam:  Awake alert and oriented.  Ambulatory into triage    Brief workup plan:  Labs, UA IV fluids    Preliminary workup initiated; this workup will be continued and followed by the physician or advanced practice provider that is assigned to the patient when roomed.  "

## 2022-06-14 NOTE — H&P
[facility] Hospital Medicine History & Physical Examination       Patient Name: Tato Schmidt  MRN: [id number]  Patient Class: OP- Observation   Admission Date: 6/14/2022   Admitting Physician: Dr. Murcia  Length of Stay: 0  Primary Care Provider: Daniel Vela MD  Attending Physician: Dr. Murcia  Face-to-Face encounter date: 06/14/2022  Code Status:  Full  Chief Complaint: N/V, dysphagia      Patient information was obtained from patient, patient's family, past medical records and ER records.     HISTORY OF PRESENT ILLNESS:   Tato Schmidt is a 57 y.o. 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 intake and generalized weakness over the past x6 days. He states that he feels the food/liquids getting stuck in his throat when he eats which causes him to regurgitate.  He denied any nausea, vomiting, or abdominal pain and is able to tolerate his secretions.  GI was consulted while he was in the ED who took him for an EGD on 06/14/2022 for a possible food bolus, but during the procedure he was noted to have an esophageal stricture which was dilated.  The information about the procedure was obtained from the ED physician. Hospital Medicine was consulted for admission given the patient's inability to tolerate p.o. intake and GI will follow along.    Of note, he was seen in the ED on 05/08/2022 with similar symptoms but was able to tolerate p.o. after receiving IV glucagon and famotidine while in the ED so he was discharged home.  The entire history is obtained per chart review given the patient is still in the GI lab.  For    His vital signs are  currently stable.  His lab showed a bicarb of 19, otherwise was mostly unremarkable.  PAST MEDICAL HISTORY:   Gallstone pancreatitis, pancreatic fistula/cyst, diabetes mellitus    PAST SURGICAL HISTORY:   Bamlan and etesev infusion (04/26/2021)   Cholecystectomy (2010)   Cervical cap procedure (05/01/2000)  Pancreatic stent in 2010    ALLERGIES:   Patient has no known allergies.    FAMILY HISTORY:   Reviewed and negative    SOCIAL HISTORY:   Tobacco- none  Alcohol- none  Illicit Drugs- none  HOME MEDICATIONS:     Prior to Admission medications    Medication Sig Start Date End Date Taking? Authorizing Provider   dulaglutide (TRULICITY SUBQ) Inject into the skin.   Yes Historical Provider   empagliflozin (JARDIANCE) 25 mg tablet Take 25 mg by mouth once daily.   Yes Historical Provider   metFORMIN (GLUCOPHAGE) 500 MG tablet Take 500 mg by mouth 2 (two) times daily with meals.   Yes Historical Provider       REVIEW OF SYSTEMS:   Unable to obtain since the patient is currently in GI lab    PHYSICAL EXAM:     VITAL SIGNS: 24 HRS MIN & MAX LAST   Temp  Min: 96.8 °F (36 °C)  Max: 98.1 °F (36.7 °C) 96.8 °F (36 °C)   BP  Min: 112/67  Max: 156/91 114/69   Pulse  Min: 74  Max: 100  85   Resp  Min: 15  Max: 25 (!) 21   SpO2  Min: 97 %  Max: 100 % 100 %   Vital signs reviewed.    Deferred to MD    LABS AND IMAGING:     Recent Labs   Lab 06/14/22  0932   WBC 5.2   RBC 5.41   HGB 14.4   HCT 44.4   MCV 82.1   MCH 26.6*   MCHC 32.4*   RDW 13.5      MPV 12.3       Recent Labs   Lab 06/14/22  0932      K 4.2   CO2 19*   BUN 15.9   CREATININE 0.86   CALCIUM 9.3   ALBUMIN 3.8   ALKPHOS 62   ALT 7   AST 18   BILITOT 1.2       Microbiology Results (last 7 days)     ** No results found for the last 168 hours. **           CT Abdomen Pelvis W Wo Contrast     History.  Abdominal pain.     Reference.  19 October 2011     Technique.  Helical acquisition through the abdomen and pelvis without and with IV  contrast. Three plane  reconstructions were provided for review. DLP  1824 mGycm. Automatic exposure control, adjustment of mA/kV or  iterative reconstruction technique was used to reduce radiation.     Findings.  The limited imaged lung bases are clear.     Minimal intrahepatic biliary ductal dilatation primarily left lobe.  Common bile duct not significantly dilated. Gallbladder surgically  absent. Spleen not significantly enlarged. Slightly smaller pancreatic  pseudocyst along the tail. Measures 54 x 29 mm on image 30 series 6,  previously 56 x 47 mm. A pseudocyst anterior to the descending colon  image 39 series 6 measures 68 x 53 mm, previously 76 x 61 mm. Small  cystic lesion near the GE junction is new on image 16 series 6  measuring up 17 mm. This may be contiguous with the pancreatic tail  lesion. No adrenal nodule. No hydronephrosis.     There is no bowel obstruction. There is some wall thickening of the  stomach which may be secondary to the pancreatic process. No free air.  There is peritoneal dialysis catheter. No significant free fluid.  Similar prominent mesenteric lymph nodes.     Urinary bladder unremarkable. Abdominal aorta normal in caliber. No  significantly enlarged retroperitoneal lymph nodes.      No acute osseous findings.     Impression.     1. Pancreatic pseudocysts are mostly smaller though there is a small  cyst near the GE junction which is new compared to 2011.  2. Difficult to exclude some mild acute pancreatic inflammation.  3. Gastritis which may be secondary to the pancreatitis. Suggest  correlation with upper endoscopy if not recently performed.        Electronically Signed By: Blair Rodriguez MD  Date/Time Signed: 11/19/2019 07:23        ASSESSMENT & PLAN:   Dysphagia with inability to tolerate p.o. intake  Reported esophageal stricture s/p dilation on 06/14/2022 (procedure results pending)  History of diabetes mellitus  History of gallstone pancreatitis s/p cholecystectomy in 2010  History of  previous pancreatic fistula and cyst s/p pancreatic stent placement with removal in 2010    Plan:  -GI has been consulted and is following so continue with their recommendations  -control pain and nausea p.r.n.  -continue with IV hydration  -clear liquid diet as tolerated, NPO after midnight per GI  -CT abdomen/pelvis-pending  -monitor CBG and insulin sliding scale  -repeat labs in a.m.    VTE Prophylaxis: will be placed on SCD for DVT prophylaxis and will be advised to be as mobile as possible and sit in a chair as tolerated  __________________________________________________________________________  INPATIENT LIST OF MEDICATIONS     Current Facility-Administered Medications:     dextrose 50% injection 12.5 g, 12.5 g, Intravenous, PRN, JING Gerardo    dextrose 50% injection 25 g, 25 g, Intravenous, PRN, JING Gerardo    glucagon (human recombinant) injection 1 mg, 1 mg, Intramuscular, PRN, JING Gerardo    insulin aspart U-100 injection 1-10 Units, 1-10 Units, Subcutaneous, QID (AC + HS) PRN, JING Gerardo    lactated ringers infusion, , Intravenous, Continuous, JING Gerardo    ondansetron injection 4 mg, 4 mg, Intravenous, Q8H PRN, JING Gerardo    [START ON 6/15/2022] pantoprazole EC tablet 40 mg, 40 mg, Oral, Daily, Jasmin Arthur PA-C      Scheduled Meds:  Continuous Infusions:  PRN Meds:.      Discharge Planning and Disposition/Anticipated discharge: TBTee STRONG, BRENDEN/PA have reviewed and discussed the case with Dr. Murcia.   Please see the following addendum for further assessment and plan from there attending MD.  06/14/2022    ________________________________________________________________________________    MD Addendum:  IDr_ assumed care of this patient today at --am/pm  For the patient encounter, I performed the substantive portion of the visit, I reviewed the NP/PA documentation, treatment plan, and medical decision making.  I had face to face time  with this patient     A. History:    B. Physical exam:    C. Medical decision making:      All diagnosis and differential diagnosis have been reviewed; assessment and plan has been documented; I have personally reviewed the labs and test results that are presently available; I have reviewed the patients medication list; I have reviewed the consulting providers response and recommendations. I have reviewed or attempted to review medical records based upon their availability.    All of the patient and family questions have been addressed and answered. Patient's is agreeable to the above stated plan. I will continue to monitor closely and make adjustments to medical management as needed.    JING Gerardo   06/14/2022

## 2022-06-14 NOTE — ANESTHESIA PREPROCEDURE EVALUATION
06/14/2022  Tato Schmidt is a 57 y.o., male.      Pre-op Assessment    I have reviewed the Patient Summary Reports.     I have reviewed the Nursing Notes. I have reviewed the NPO Status.   I have reviewed the Medications.     Review of Systems  Anesthesia Hx:  No problems with previous Anesthesia    Social:  Former Smoker    Hematology/Oncology:  Hematology Normal   Oncology Normal     EENT/Dental:EENT/Dental Normal   Cardiovascular:   Exercise tolerance: good Denies Hypertension.  Denies Dysrhythmias.   Functional Capacity good / => 4 METS    Pulmonary:  Pulmonary Normal    Renal/:   Denies Chronic Renal Disease.     Hepatic/GI:  Hepatic/GI Normal    Musculoskeletal:  Musculoskeletal Normal    Endocrine:   Diabetes  Denies Morbid Obesity / BMI > 40  Dermatological:  Skin Normal    Psych:  Psychiatric Normal           Physical Exam  General: Alert, Oriented, Well nourished and Cooperative    Airway:  Mallampati: II   Mouth Opening: Normal  TM Distance: Normal  Tongue: Normal  Neck ROM: Normal ROM    Dental:  Intact    Chest/Lungs:  Clear to auscultation, Normal Respiratory Rate    Heart:  Rate: Normal  Rhythm: Regular Rhythm        Anesthesia Plan  Type of Anesthesia, risks & benefits discussed:    Anesthesia Type: Gen Natural Airway  Intra-op Monitoring Plan: Standard ASA Monitors  Post Op Pain Control Plan: multimodal analgesia  Induction:  IV  Airway Plan: Direct  Informed Consent: Informed consent signed with the Patient and all parties understand the risks and agree with anesthesia plan.  All questions answered. Patient consented to blood products? Yes  ASA Score: 2  Day of Surgery Review of History & Physical: H&P Update referred to the surgeon/provider.    Ready For Surgery From Anesthesia Perspective.     .

## 2022-06-14 NOTE — ANESTHESIA PROCEDURE NOTES
Intubation    Date/Time: 6/14/2022 2:39 PM  Performed by: Amish Pedraza MD  Authorized by: Amish Pedraza MD     Intubation:     Induction:  Intravenous    Intubated:  Postinduction    Mask Ventilation:  Easy mask    Attempts:  2    Attempted By:  CRNA    Method of Intubation:  Direct    Blade:  Duran 2    Laryngeal View Grade: Grade III - only epiglottis visible      Laryngeal View Grade comment:  Grade III view with DL, stiff neck, grade 2 view Parker    Attempted By (2nd Attempt):  CRNA    Method of Intubation (2nd Attempt):  Video laryngoscopy    Blade (2nd Attempt):  Parker 3    Laryngeal View Grade (2nd Attempt): Grade IIa - cords partially seen      Difficult Airway Encountered?: No      Complications:  None    Airway Device:  Oral endotracheal tube    Airway Device Size:  7.5    Style/Cuff Inflation:  Cuffed    Tube secured:  23    Secured at:  The lips    Placement Verified By:  Capnometry    Complicating Factors:  Poor neck/head extension    Findings Post-Intubation:  BS equal bilateral and atraumatic/condition of teeth unchanged

## 2022-06-14 NOTE — TRANSFER OF CARE
"Anesthesia Transfer of Care Note    Patient: Tato Schmidt    Procedure(s) Performed: Procedure(s) (LRB):  EGD Food Bolus (N/A)  ESOPHAGOSCOPY, USING BOUGIE, WITH DILATION (N/A)  EGD, WITH CLOSED BIOPSY (N/A)    Patient location: GI    Anesthesia Type: general    Transport from OR: Transported from OR on room air with adequate spontaneous ventilation    Post pain: adequate analgesia    Post assessment: no apparent anesthetic complications    Post vital signs: stable    Level of consciousness: awake and alert    Complications: none    Transfer of care protocol was followed      Last vitals:   Visit Vitals  /85 (BP Location: Left arm, Patient Position: Lying)   Pulse 86   Temp 96.8 °F (36 °C) (Oral)   Resp 18   Ht 5' 10" (1.778 m)   Wt 92.5 kg (204 lb)   SpO2 97%   BMI 29.27 kg/m²     "

## 2022-06-14 NOTE — PROVATION PATIENT INSTRUCTIONS
Discharge Summary/Instructions after an Endoscopic Procedure  Patient Name: Tato Schmidt  Patient MRN: [id number]  Patient YOB: 1965 Tuesday, June 14, 2022  Franc Pal MD  Dear patient,  As a result of recent federal legislation (The Federal Cures Act), you may   receive lab or pathology results from your procedure in your [facility]   account before your physician is able to contact you. Your physician or   their representative will relay the results to you with their   recommendations at their soonest availability.  Thank you,  RESTRICTIONS:  During your procedure today, you received medications for sedation.  These   medications may affect your judgment, balance and coordination.  Therefore,   for 24 hours, you have the following restrictions:   - DO NOT drive a car, operate machinery, make legal/financial decisions,   sign important papers or drink alcohol.    ACTIVITY:  Today: no heavy lifting, straining or running due to procedural   sedation/anesthesia.  The following day: return to full activity including work.  DIET:  Eat and drink normally unless instructed otherwise.     TREATMENT FOR COMMON SIDE EFFECTS:  - Mild abdominal pain, nausea, belching, bloating or excessive gas:  rest,   eat lightly and use a heating pad.  - Sore Throat: treat with throat lozenges and/or gargle with warm salt   water.  - Because air was used during the procedure, expelling large amounts of air   from your rectum or belching is normal.  - If a bowel prep was taken, you may not have a bowel movement for 1-3 days.    This is normal.  SYMPTOMS TO WATCH FOR AND REPORT TO YOUR PHYSICIAN:  1. Abdominal pain or bloating, other than gas cramps.  2. Chest pain.  3. Back pain.  4. Signs of infection such as: chills or fever occurring within 24 hours   after the procedure.  5. Rectal bleeding, which would show as bright red, maroon, or black stools.   (A tablespoon of blood from the rectum is not serious, especially  if   hemorrhoids are present.)  6. Vomiting.  7. Weakness or dizziness.  GO DIRECTLY TO THE NEAREST EMERGENCY ROOM IF YOU HAVE ANY OF THE FOLLOWING:      Difficulty breathing              Chills and/or fever over 101 F   Persistent vomiting and/or vomiting blood   Severe abdominal pain   Severe chest pain   Black, tarry stools   Bleeding- more than one tablespoon   Any other symptom or condition that you feel may need urgent attention  Your doctor recommends these additional instructions:  If any biopsies were taken, your doctors clinic will contact you in 1 to 2   weeks with any results.  -REcommend admit the patient to hospital medicine  -CT scan of lower chest and abdomen with po contrast  -Will consult Dr. Carlson for possible EUS guided drainage of the cyst   pending Ct scan  For questions, problems or results please call your physician - Franc Pal MD at Work:  [phone].  [facility] PHONE NUMBER: [phone]  IF A COMPLICATION OR EMERGENCY SITUATION ARISES AND YOU ARE UNABLE TO REACH   YOUR PHYSICIAN - GO DIRECTLY TO THE EMERGENCY ROOM.  MD Franc Velez MD  6/14/2022 3:55:36 PM  This report has been verified and signed electronically.  Dear patient,  As a result of recent federal legislation (The Federal Cures Act), you may   receive lab or pathology results from your procedure in your [facility]   account before your physician is able to contact you. Your physician or   their representative will relay the results to you with their   recommendations at their soonest availability.  Thank you,  PROVATION

## 2022-06-14 NOTE — H&P
H&P Note    HPI:     This is a 58 yo AAM unknown to our group.  He is previously known to the gastro clinic and has been fired.  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  He has a hx of dysphagia to solids in 2019 for which EGD was scheduled but never performed.     Patient initially presented to the ED on 22 with dysphagia.  He was able to tolerate a PO change int he ED after IV glucagon and famotidine.  He was discharged home.  He has been on protonix since.  Initially 20 mg daily and that was increased to 40 mg, despite this he has not had any improvement.      Patient presented back to the ED today with c/o continued dysphagia. He states that when he tried to eat or drink, he feels as though the food gets hung up and won't go down which causes him to regurgitate the food.  Symptoms worsened on Thursday, 6 days ago.  No nausea, vomiting, abdominal pain.  He feels weak due to his inability to tolerate PO.  Denies pyrosis or reflux and does not typically have an issue in that regard.  Currently, he does not have the sensation that something is stuck and he is tolerating his secretions.  He was given water in the ED and he was unable to tolerate, thus GI was called.     Previous records reviewed from the gastro clinic.     PCP:  Daniel Vela MD    Review of patient's allergies indicates:  No Known Allergies     No current facility-administered medications for this encounter.     No current outpatient medications on file.     (Not in a hospital admission)      Past Medical History:  Past Medical History:   Diagnosis Date    Diabetes mellitus     Pancreatitis       Past Surgical History:  Past Surgical History:   Procedure Laterality Date    CHOLECYSTECTOMY        Family History:  Family History   Problem Relation Age of Onset    Abnormal EKG Neg Hx     Abnormal  screen Neg  Hx     Achalasia Neg Hx     Achondroplasia Neg Hx     Acne Neg Hx     Actinic keratosis Neg Hx     Acute lymphoblastic leukemia Neg Hx     Acute myelogenous leukemia Neg Hx     ADD / ADHD Neg Hx     Jimi's disease Neg Hx     Adrenal disorder Neg Hx     Albinism Neg Hx     Alkaptonuria Neg Hx     Allergic rhinitis Neg Hx     Allergies Neg Hx     Allergy (severe) Neg Hx     Alopecia Neg Hx     Alpha-1 antitrypsin deficiency Neg Hx     Alport syndrome Neg Hx     ALS Neg Hx     Alzheimer's disease Neg Hx     Ambiguous genitalia Neg Hx     Amblyopia Neg Hx     Amenorrhea Neg Hx     Anal fissures Neg Hx     Anemia Neg Hx     Anesthesia problems Neg Hx     Aneurysm Neg Hx     Angelman syndrome Neg Hx     Angina Neg Hx     Angioedema Neg Hx     Ankylosing spondylitis Neg Hx     Anorectal malformation Neg Hx     Anorexia nervosa Neg Hx     Anti-cardiolipin syndrome Neg Hx     Antithrombin III deficiency Neg Hx     Anuerysm Neg Hx     Anxiety disorder Neg Hx     Aortic aneurysm Neg Hx     Aortic dissection Neg Hx     Aortic stenosis Neg Hx     Aphthous stomatitis Neg Hx     Aplastic anemia Neg Hx     Appendicitis Neg Hx     Apraxia Neg Hx     Arnold-Chiari malformation Neg Hx     Arrhythmia Neg Hx     Asbestos Neg Hx     Asperger's syndrome Neg Hx     Ataxia Neg Hx     Ataxia telangiectasia Neg Hx     Atopy Neg Hx     Atrial fibrillation Neg Hx     Atrophic kidney Neg Hx     Auditory processing disorder Neg Hx     Autism Neg Hx     Autism spectrum disorder Neg Hx     Autoimmune disease Neg Hx     AVM Neg Hx     Baker's cyst Neg Hx     Bauer's esophagus Neg Hx     Bartter's syndrome Neg Hx     Basal cell carcinoma Neg Hx     Behavior problems Neg Hx     Bell's palsy Neg Hx     Benign prostatic hyperplasia Neg Hx     Bipolar disorder Neg Hx     Birth defects Neg Hx     Birth marks Neg Hx     Blindness Neg Hx     Bone cancer Neg Hx     BOR syndrome Neg Hx      [place] legs Neg Hx     Bradycardia Neg Hx     Brain cancer Neg Hx     BRCA 1/2 Neg Hx     Breast cancer Neg Hx     Broken bones Neg Hx     Bronchiolitis Neg Hx     Bronchopulmonary dysplasia Neg Hx     Bruton's disease Neg Hx     Bulemia Neg Hx     Bullous pemphigoid Neg Hx     Bunion Neg Hx     Bursitis Neg Hx     Café-au-lait spots Neg Hx     Calcium disorder Neg Hx     Canavan disease Neg Hx     Cancer Neg Hx     Cardiomyopathy Neg Hx     Carpal tunnel syndrome Neg Hx     Cataracts Neg Hx     Celiac disease Neg Hx     Cerebral aneurysm Neg Hx     Cerebral palsy Neg Hx     Cervical cancer Neg Hx     Cervical polyp Neg Hx     Cervicitis Neg Hx     Chalasia Neg Hx     Chalazion Neg Hx     Charcot-Mirtha-Tooth disease Neg Hx     Chediak-Higashi syndrome Neg Hx     Chiari malformation Neg Hx     Childhood respiratory disease Neg Hx     Choanal atresia Neg Hx     Cholecystitis Neg Hx     Cholelithiasis Neg Hx     Cholesteatoma Neg Hx     Chondromalacia Neg Hx     Chorea Neg Hx     Chromosomal disorder Neg Hx     Chronic back pain Neg Hx     Chronic bronchitis Neg Hx     Chronic fatigue Neg Hx     Chronic granulomatous disease Neg Hx     Chronic infections Neg Hx     Cirrhosis Neg Hx     Cleft lip Neg Hx     Cleft palate Neg Hx     Clotting disorder Neg Hx     Club foot Neg Hx     Coarctation of the aorta Neg Hx     Collagen disease Neg Hx     Colon cancer Neg Hx     Colon polyps Neg Hx     Colonic polyp Neg Hx     Color blindness Neg Hx     Conduct disorder Neg Hx     Conductive hearing loss Neg Hx     Congenital adrenal hyperplasia Neg Hx     Congenital heart disease Neg Hx     Conjunctivitis Neg Hx     Consanguinity Neg Hx     Constipation Neg Hx     COPD Neg Hx     Corneal abrasion Neg Hx     Corneal ulcer Neg Hx     Coronary aneurysm Neg Hx     Coronary artery disease Neg Hx     Cowden syndrome Neg Hx     Craniosynostosis Neg Hx     Cri-du-chat  syndrome Neg Hx     Crohn's disease Neg Hx     Cushing syndrome Neg Hx     Cystic fibrosis Neg Hx     Cystic kidney disease Neg Hx     Cystinosis Neg Hx     Decreased libido Neg Hx     Deep vein thrombosis Neg Hx     Delayed menopause Neg Hx     Delayed puberty Neg Hx     Dementia Neg Hx     Dental caries Neg Hx     Depression Neg Hx     Dermatomyositis Neg Hx     JENNIFER usage Neg Hx     Developmental delay Neg Hx     Diabetes insipidus Neg Hx     Diabetes Mellitus Neg Hx     Diabetes type I Neg Hx     Diabetes type II Neg Hx     Diabetic kidney disease Neg Hx     DiGeorge syndrome Neg Hx     Dilated cardiomyopathy Neg Hx     Dislocations Neg Hx     Diverticulitis Neg Hx     Diverticulosis Neg Hx     Down syndrome Neg Hx     Drug abuse Neg Hx     Dupuytren's contracture Neg Hx     Dwarfism Neg Hx     Dysfunctional uterine bleeding Neg Hx     Dysmenorrhea Neg Hx     Dyspareunia Neg Hx     Dysphagia Neg Hx     Dysrhythmia Neg Hx     Dystonia Neg Hx     Early death Neg Hx     Early menopause Neg Hx     Early puberty Neg Hx     Eating disorder Neg Hx     Eclampsia Neg Hx     Ectodermal dysplasia Neg Hx     Eczema Neg Hx     Edema Neg Hx     Edward's syndrome Neg Hx     Amarjit-Danlos syndrome Neg Hx     Emotional abuse Neg Hx     Emphysema Neg Hx     Encopresis Neg Hx     Endocrine tumor Neg Hx     Endocrinopathy Neg Hx     Endometrial cancer Neg Hx     Endometriosis Neg Hx     Enuresis Neg Hx     Eosinophilic granuloma Neg Hx     Epididymitis Neg Hx     Erectile dysfunction Neg Hx     Erythema nodosum Neg Hx     Esophageal cancer Neg Hx     Esophageal varices Neg Hx     Esophagitis Neg Hx     Essential Tremor Neg Hx     Exostosis Neg Hx     Fabry's disease Neg Hx     Factor IX deficiency Neg Hx     Factor V Leiden deficiency Neg Hx     Factor VIII deficiency Neg Hx     Failure to thrive Neg Hx     Fainting Neg Hx     Familial dysautonomia Neg Hx      Familial nephritis Neg Hx     Familial polyposis Neg Hx     Fanconi anemia Neg Hx     Febrile seizures Neg Hx     Fibrocystic breast disease Neg Hx     Fibroids Neg Hx     Fibromyalgia Neg Hx     Food intolerance Neg Hx     Fragile X syndrome Neg Hx     Friedreich's ataxia Neg Hx     Frontotemporal dementia Neg Hx     Fuch's dystrophy Neg Hx     Gait disorder Neg Hx     Galactosemia Neg Hx     Gallbladder disease Neg Hx     Gaucher's disease Neg Hx     Genodermatoses Neg Hx     WILMER disease Neg Hx     Gestational diabetes Neg Hx     GI problems Neg Hx     Glaucoma Neg Hx     Glomerulonephritis Neg Hx     Glucose-6-phos deficiency Neg Hx     Glycogen storage disease Neg Hx     Goiter Neg Hx     Gonadal disorder Neg Hx     Gout Neg Hx     Graves' disease Neg Hx     Growth hormone deficiency Neg Hx      problems Neg Hx     Hammer toes Neg Hx     Hartnup's disease Neg Hx     Hashimoto's thyroiditis Neg Hx     Hearing loss Neg Hx     Heart attack Neg Hx     Heart block Neg Hx     Heart defect Neg Hx     Heart disease Neg Hx     Heart failure Neg Hx     Heart murmur Neg Hx     Hemangiomas Neg Hx     Hematuria Neg Hx     Hemochromatosis Neg Hx     Hemolytic uremic syndrome Neg Hx     Hemophilia Neg Hx     Henoch-Schonlein purpura Neg Hx     Hepatitis Neg Hx     Hepatomegaly Neg Hx     Hereditary spherocytosis Neg Hx     Hernia Neg Hx     Hiatal hernia Neg Hx     High arches Neg Hx     Hip dysplasia Neg Hx     Hip fracture Neg Hx     Hirschsprung's disease Neg Hx     Hirsutism Neg Hx     Histiocytosis X Neg Hx     HIV Neg Hx     HLA-B27 positive Neg Hx     Hodgkin's lymphoma Neg Hx     Homocystinuria Neg Hx     Ilir's disease Neg Hx     Cortland's disease Neg Hx     Hydrocele Neg Hx     Hydrocephalus Neg Hx     Hygroma Neg Hx     Hypercalcemia Neg Hx     Hypereosinophilic syndrome Neg Hx     Hyperinsulinemia Neg Hx     Hyperkalemia Neg Hx      Hyperlipidemia Neg Hx     Hypermobility Neg Hx     Hypernasality Neg Hx     Hyperopia Neg Hx     Hyperparathyroidism Neg Hx     Hypersomnolence Neg Hx     Hypertension Neg Hx     Hyperthyroidism Neg Hx     Hypertrophic cardiomyopathy Neg Hx     Hypoglycemic Neg Hx     Hypokalemia Neg Hx     Hypoparathyroidism Neg Hx     Hypoplastic kidney Neg Hx     Hypotension Neg Hx     Hypothyroidism Neg Hx     Idiopathic pulmonary fibrosis Neg Hx     Idiopathic torsion dystonia Neg Hx     IgA nephropathy Neg Hx     Immunodeficiency Neg Hx     Imperforate anus Neg Hx     Impotence Neg Hx     Impulse control disorder Neg Hx     Incompetent cervix Neg Hx     Infertility Neg Hx     Inflammatory bowel disease Neg Hx     Inguinal hernia Neg Hx     Insomnia Neg Hx     Insulin resistance Neg Hx     Interstitial cystitis Neg Hx     Intestinal malrotation Neg Hx     Intestinal polyp Neg Hx     Intracerebral hemorrhage Neg Hx     Iron deficiency Neg Hx     Irregular heart beat Neg Hx     Irritable bowel syndrome Neg Hx     Jaundice Neg Hx     Job's syndrome Neg Hx     Joint hypermobility Neg Hx     Juvenile idiopathic arthritis Neg Hx     Kartagener's syndrome Neg Hx     Kawasaki disease Neg Hx     Keloids Neg Hx     Chance's disease Neg Hx     Keratoconus Neg Hx     Kidney cancer Neg Hx     Kidney disease Neg Hx     Kidney failure Neg Hx     Kidney nephrosis Neg Hx     Klinefelter's syndrome Neg Hx     Krabbe disease Neg Hx     Kyphosis Neg Hx     Labyrinthitis Neg Hx     Lactose intolerance Neg Hx     Language disorder Neg Hx     Lead poisoning Neg Hx     Learning disabilities Neg Hx     Gqnv-Yfajt-Xafzggr disease Neg Hx     Lesch-Nyhan syndrome Neg Hx     Leukemia Neg Hx     Lichen planus Neg Hx     Li-Fraumeni syndrome Neg Hx     Liver cancer Neg Hx     Liver disease Neg Hx     Long QT syndrome Neg Hx     Lumbar disc disease Neg Hx     Lung cancer Neg Hx     Lung  disease Neg Hx     Lupus Neg Hx     Lymphoma Neg Hx     Macrocephaly Neg Hx     Macrosomia Neg Hx     Macular degeneration Neg Hx     Malignant hypertension Neg Hx     Malignant hyperthermia Neg Hx     Maple syrup urine disease Neg Hx     Marfan syndrome Neg Hx     Mastocytosis Neg Hx     Melanoma Neg Hx     Memory loss Neg Hx     Meniere's disease Neg Hx     Meningitis Neg Hx     Menstrual problems Neg Hx     Mental illness Neg Hx     Mental retardation Neg Hx     Metabolic syndrome Neg Hx     Microcephaly Neg Hx     Migraines Neg Hx     Milk intolerance Neg Hx     Miscarriages / Stillbirths Neg Hx     Mitochondrial disorder Neg Hx     Mitral valve prolapse Neg Hx     Motor neuron disease Neg Hx     Movement disorder Neg Hx     Moyamoya disease Neg Hx     Multiple births Neg Hx     Multiple endocrine neoplasia Neg Hx     Multiple fractures Neg Hx     Multiple myeloma Neg Hx     Multiple sclerosis Neg Hx     Muscle cancer Neg Hx     Muscular dystrophy Neg Hx     Myasthenia gravis Neg Hx     Myelodysplastic syndrome Neg Hx     Myocarditis Neg Hx     Myoclonus Neg Hx     Myopathy Neg Hx     Nail disease Neg Hx     Narcolepsy Neg Hx     Nephritis Neg Hx     Nephrolithiasis Neg Hx     Nephrotic syndrome Neg Hx     Neural tube defect Neg Hx     Neurodegenerative disease Neg Hx     Neurofibromatosis Neg Hx     Neuromuscular disorder Neg Hx     Neuropathy Neg Hx     Neutropenia Neg Hx     Nevi Neg Hx     Azucena-Pick disease Neg Hx     Night blindness Neg Hx     Nocturnal enuresis Neg Hx     Nystagmus Neg Hx     Obesity Neg Hx     OCD Neg Hx     ODD Neg Hx     Orchitis Neg Hx     Osler-Weber-Rendu syndrome Neg Hx     Osteoarthritis Neg Hx     Osteochondroma Neg Hx     Osteogenesis imperfecta Neg Hx     Osteopenia Neg Hx     Osteoporosis Neg Hx     Osteosarcoma Neg Hx     Osteosclerosis Neg Hx     Other Neg Hx     Otitis media Neg Hx     Ovarian cancer  Neg Hx     Ovarian cysts Neg Hx     Oxalosis Neg Hx     Paget's disease of bone Neg Hx     Pancreatic cancer Neg Hx     Pancreatitis Neg Hx     Panhypopituitarism Neg Hx     Panic disorder Neg Hx     Paranoid behavior Neg Hx     Parasomnia Neg Hx     Parkinsonism Neg Hx     Patau's syndrome Neg Hx     Pathological gambling Neg Hx     PDD Neg Hx     Pectus carinatum Neg Hx     Pectus excavatum Neg Hx     Pelvic inflammatory disease Neg Hx     Pemphigus vulgaris Neg Hx     Penile cancer Neg Hx     Periodic limb movement Neg Hx     Peripheral vascular disease Neg Hx     Pernicious anemia Neg Hx     Personality disorder Neg Hx     Pes cavus Neg Hx     Physical abuse Neg Hx     Aiden Regan sequence Neg Hx     PKU Neg Hx     Plantar fasciitis Neg Hx     Pleurisy Neg Hx     Pneumonia Neg Hx     Polychondritis Neg Hx     Polycystic kidney disease Neg Hx     Polycystic ovary syndrome Neg Hx     Polycythemia Neg Hx     Polymyalgia rheumatica Neg Hx     Polymyositis Neg Hx     Pompe disease Neg Hx     Posterior urethral valves Neg Hx     Post-traumatic stress disorder Neg Hx     Prader-Willi syndrome Neg Hx     Premature birth Neg Hx     Premature ovarian failure Neg Hx      labor Neg Hx     Prolactinoma Neg Hx     Prostate cancer Neg Hx     Prostatitis Neg Hx     Protein C deficiency Neg Hx     Protein S deficiency Neg Hx     Proteinuria Neg Hx     Prune belly syndrome Neg Hx     Pruritis Neg Hx     Pseudochol deficiency Neg Hx     Pseudotumor cerebri Neg Hx     Psoriasis Neg Hx     Psychosis Neg Hx     Ptosis Neg Hx     Pulmonary embolism Neg Hx     Pulmonary fibrosis Neg Hx     Pyelonephritis Neg Hx     Pyloric stenosis Neg Hx     Pyruvate dehydrogenase deficiency Neg Hx     Rashes / Skin problems Neg Hx     Raynaud syndrome Neg Hx     Rectal cancer Neg Hx     Recurrent abdominal pain Neg Hx     Lalita's syndrome Neg Hx     Renal tubular acidosis Neg  Hx     Restless legs syndrome Neg Hx     Retinal degeneration Neg Hx     Retinal detachment Neg Hx     Retinitis pigmentosa Neg Hx     Retinoblastoma Neg Hx     Retinopathy of prematurity Neg Hx     Reye's syndrome Neg Hx     Rheum arthritis Neg Hx     Rheumatic fever Neg Hx     Rheumatologic disease Neg Hx     Rhinitis Neg Hx     Rickets Neg Hx     Rosacea Neg Hx     Sacroiliitis Neg Hx     Sarcoidosis Neg Hx     Schizophrenia Neg Hx     Scleritis Neg Hx     Scleroderma Neg Hx     Scoliosis Neg Hx     Seasonal affective disorder Neg Hx     Seizures Neg Hx     Selective mutism Neg Hx     Sensorineural hearing loss Neg Hx     Severe combined immunodeficiency Neg Hx     Severe sprains Neg Hx     Sexual abuse Neg Hx     Short stature Neg Hx     Sick sinus syndrome Neg Hx     Sickle cell anemia Neg Hx     Sickle cell trait Neg Hx     SIDS Neg Hx     Single kidney Neg Hx     Sinusitis Neg Hx     Sjogren's syndrome Neg Hx     Skeletal dysplasia Neg Hx     Skin cancer Neg Hx     Skin telangiectasia Neg Hx     Sleep apnea Neg Hx     Sleep disorder Neg Hx     Sleep walking Neg Hx     Snoring Neg Hx     Social phobia Neg Hx     Speech disorder Neg Hx     Spina bifida Neg Hx     Spinal muscular atrophy Neg Hx     Splenomegaly Neg Hx     Spondyloarthropathy Neg Hx     Spondylolisthesis Neg Hx     Spondylolysis Neg Hx     Squamous cell carcinoma Neg Hx     Anthony-Oskar syndrome Neg Hx     Stickler syndrome Neg Hx     Stomach cancer Neg Hx     Strabismus Neg Hx     Stroke Neg Hx     Stuttering Neg Hx     Subarachnoid hemorrhage Neg Hx     Sudden death Neg Hx     Suicidality Neg Hx     Supraventricular tachycardia Neg Hx     Swallowing difficulties Neg Hx     Tall stature Neg Hx     Gary-Sachs disease Neg Hx     Testicular cancer Neg Hx     Thalassemia Neg Hx     Throat cancer Neg Hx     Thrombocytopenia Neg Hx     Thrombophilia Neg Hx     Thrombophlebitis Neg  Hx     Thrombosis Neg Hx     Thymic aplasia Neg Hx     Thyroid cancer Neg Hx     Thyroid disease Neg Hx     Thyroid nodules Neg Hx     Tics Neg Hx     Tongue cancer Neg Hx     Torticollis Neg Hx     Tourette syndrome Neg Hx     Tracheal cancer Neg Hx     Tracheoesophageal fistual Neg Hx     Tracheomalacia Neg Hx     Transient ischemic attack Neg Hx     Treacher Adams syndrome Neg Hx     Tremor Neg Hx     Tuberculosis Neg Hx     Tuberous sclerosis Neg Hx     Kim syndrome Neg Hx     Ulcerative colitis Neg Hx     Ulcers Neg Hx     Undescended testes Neg Hx     Unexplained death Neg Hx     Urinary tract infection Neg Hx     Urolithiasis Neg Hx     Urologic Abnormality Neg Hx     Urticaria Neg Hx     Uterine cancer Neg Hx     Uveitis Neg Hx     Vaginal cancer Neg Hx     Valvular heart disease Neg Hx     Vasculitis Neg Hx     Velocardiofacial syndrome Neg Hx     Venous thrombosis Neg Hx     Verruca vulgaris  Neg Hx     Vesicoureteral reflux Neg Hx     Vision loss Neg Hx     Vitamin D deficiency Neg Hx     Vitiligo Neg Hx     Voice disorder Neg Hx     Von Gierke's disease Neg Hx     Von Hippel-Lindau syndrome Neg Hx     Von Willebrand disease Neg Hx     Werdnig Stevens Disease Neg Hx     Kimo syndrome Neg Hx     Wilm's tumor Neg Hx     Valentin's disease Neg Hx     Wiskott-Dagmar syndrome Neg Hx     Oswaldo Parkinson White syndrome Neg Hx     Xeroderma pigmentosa Neg Hx      Social History:  Social History     Tobacco Use    Smoking status: Never Smoker    Smokeless tobacco: Not on file   Substance Use Topics    Alcohol use: No       Review of Systems:     Review of Systems   Constitutional: Negative for chills, fatigue and fever.   HENT: Positive for trouble swallowing.    Respiratory: Negative for cough, chest tightness and shortness of breath.    Cardiovascular: Negative for chest pain, palpitations and leg swelling.   Gastrointestinal: Positive for constipation.  Negative for abdominal distention, abdominal pain, blood in stool, diarrhea, nausea and vomiting.   Musculoskeletal: Negative for back pain.   Skin: Negative for color change.   Neurological: Positive for weakness (generalized, nonfocal).   Psychiatric/Behavioral: Negative for agitation. The patient is not nervous/anxious.        Objective:     VITAL SIGNS: 24 HR MIN & MAX LAST    Temp  Min: 98.1 °F (36.7 °C)  Max: 98.1 °F (36.7 °C)  98.1 °F (36.7 °C)        BP  Min: 112/67  Max: 145/80  125/81     Pulse  Min: 79  Max: 87  79     Resp  Min: 15  Max: 20  20    SpO2  Min: 97 %  Max: 99 %  99 %      No intake or output data in the 24 hours ending 06/14/22 1316    Physical Exam  Constitutional:       General: He is not in acute distress.     Appearance: He is not ill-appearing.      Comments: Tolerating secretions   HENT:      Head: Normocephalic and atraumatic.   Eyes:      General: No scleral icterus.     Extraocular Movements: Extraocular movements intact.   Cardiovascular:      Rate and Rhythm: Normal rate and regular rhythm.   Pulmonary:      Effort: Pulmonary effort is normal. No respiratory distress.   Abdominal:      General: Bowel sounds are normal. There is no distension.      Palpations: Abdomen is soft. There is no mass.      Tenderness: There is no abdominal tenderness. There is no guarding or rebound.   Musculoskeletal:      Right lower leg: No edema.      Left lower leg: No edema.   Skin:     General: Skin is warm and dry.   Neurological:      Mental Status: He is alert and oriented to person, place, and time.   Psychiatric:         Mood and Affect: Mood normal.         Behavior: Behavior normal.           Recent Results (from the past 48 hour(s))   Comprehensive metabolic panel    Collection Time: 06/14/22  9:32 AM   Result Value Ref Range    Sodium Level 138 136 - 145 mmol/L    Potassium Level 4.2 3.5 - 5.1 mmol/L    Chloride 106 98 - 107 mmol/L    Carbon Dioxide 19 (L) 22 - 29 mmol/L    Glucose Level  118 (H) 74 - 100 mg/dL    Blood Urea Nitrogen 15.9 8.4 - 25.7 mg/dL    Creatinine 0.86 0.73 - 1.18 mg/dL    Calcium Level Total 9.3 8.4 - 10.2 mg/dL    Protein Total 8.6 (H) 6.4 - 8.3 gm/dL    Albumin Level 3.8 3.5 - 5.0 gm/dL    Globulin 4.8 (H) 2.4 - 3.5 gm/dL    Albumin/Globulin Ratio 0.8 (L) 1.1 - 2.0 ratio    Bilirubin Total 1.2 <=1.5 mg/dL    Alkaline Phosphatase 62 40 - 150 unit/L    Alanine Aminotransferase 7 0 - 55 unit/L    Aspartate Aminotransferase 18 5 - 34 unit/L    Estimated GFR- >60 mls/min/1.73/m2   CBC with Differential    Collection Time: 06/14/22  9:32 AM   Result Value Ref Range    WBC 5.2 4.5 - 11.5 x10(3)/mcL    RBC 5.41 4.70 - 6.10 x10(6)/mcL    Hgb 14.4 14.0 - 18.0 gm/dL    Hct 44.4 42.0 - 52.0 %    MCV 82.1 80.0 - 94.0 fL    MCH 26.6 (L) 27.0 - 31.0 pg    MCHC 32.4 (L) 33.0 - 36.0 mg/dL    RDW 13.5 11.5 - 17.0 %    Platelet 203 130 - 400 x10(3)/mcL    MPV 12.3 9.4 - 12.4 fL    IG# 0.02 (H) 0 - 0.0155 x10(3)/mcL    IG% 0.4 0 - 0.43 %    NRBC% 0.0 %   Manual Differential    Collection Time: 06/14/22  9:32 AM   Result Value Ref Range    Neut Man 74 %    Lymph Man 10 %    Monocyte Man 11 %    Eos Man 1 %    Basophil Man 4 %    Instr WBC 5.2 x10(3)/mcL    Abs Mono 0.572 0.1 - 1.3 x10(3)/mcL    Abs Eos  0.052 0 - 0.9 x10(3)/mcL    Abs Baso 0.208 (H) 0 - 0.2 x10(3)/mcL    Abs Lymp 0.52 (L) 0.6 - 4.6 x10(3)/mcL    Abs Neut 3.848 2.1 - 9.2 x10(3)/mcL    RBC Morph Normal Normal    Platelet Est Adequate Normal, Adequate       No results found.    Assessment / Plan:     56 yo AAM unknown to our group.  He is previously known to the gastro clinic and has been fired.  Patient with a pmhx of DM2, gallstone pancreatitis with pancreatic duct fistula and cyst s/p pancreatic stent s/p removal and laveen shunt all in 2010, cholecystectomy.  Here with c/o dysphagia.    1. Dysphagia to solids and liquids  Possible food impaction, although he denies sensation of food lodged and is tolerating  secretions.   - Keep NPO.  Plan for EGD today with further recommendations to follow      Thank you for allowing us to participate in this patient's care.

## 2022-06-15 PROBLEM — R13.19 ESOPHAGEAL DYSPHAGIA: Chronic | Status: ACTIVE | Noted: 2022-06-15

## 2022-06-15 LAB
ALBUMIN SERPL-MCNC: 3.3 GM/DL (ref 3.5–5)
ALBUMIN/GLOB SERPL: 0.7 RATIO (ref 1.1–2)
ALP SERPL-CCNC: 54 UNIT/L (ref 40–150)
ALT SERPL-CCNC: 7 UNIT/L (ref 0–55)
AST SERPL-CCNC: 15 UNIT/L (ref 5–34)
BASOPHILS # BLD AUTO: 0.02 X10(3)/MCL (ref 0–0.2)
BASOPHILS NFR BLD AUTO: 0.4 %
BILIRUBIN DIRECT+TOT PNL SERPL-MCNC: 0.9 MG/DL
BUN SERPL-MCNC: 13.4 MG/DL (ref 8.4–25.7)
CALCIUM SERPL-MCNC: 9.4 MG/DL (ref 8.4–10.2)
CHLORIDE SERPL-SCNC: 108 MMOL/L (ref 98–107)
CO2 SERPL-SCNC: 21 MMOL/L (ref 22–29)
CREAT SERPL-MCNC: 0.79 MG/DL (ref 0.73–1.18)
EOSINOPHIL # BLD AUTO: 0.01 X10(3)/MCL (ref 0–0.9)
EOSINOPHIL NFR BLD AUTO: 0.2 %
ERYTHROCYTE [DISTWIDTH] IN BLOOD BY AUTOMATED COUNT: 13.3 % (ref 11.5–17)
GLOBULIN SER-MCNC: 4.9 GM/DL (ref 2.4–3.5)
GLUCOSE SERPL-MCNC: 97 MG/DL (ref 74–100)
HCT VFR BLD AUTO: 40.4 % (ref 42–52)
HGB BLD-MCNC: 13 GM/DL (ref 14–18)
IMM GRANULOCYTES # BLD AUTO: 0.02 X10(3)/MCL (ref 0–0.02)
IMM GRANULOCYTES NFR BLD AUTO: 0.4 % (ref 0–0.43)
LYMPHOCYTES # BLD AUTO: 0.6 X10(3)/MCL (ref 0.6–4.6)
LYMPHOCYTES NFR BLD AUTO: 13.1 %
MCH RBC QN AUTO: 26.9 PG (ref 27–31)
MCHC RBC AUTO-ENTMCNC: 32.2 MG/DL (ref 33–36)
MCV RBC AUTO: 83.5 FL (ref 80–94)
MONOCYTES # BLD AUTO: 0.7 X10(3)/MCL (ref 0.1–1.3)
MONOCYTES NFR BLD AUTO: 15.3 %
NEUTROPHILS # BLD AUTO: 3.2 X10(3)/MCL (ref 2.1–9.2)
NEUTROPHILS NFR BLD AUTO: 70.6 %
NRBC BLD AUTO-RTO: 0 %
PLATELET # BLD AUTO: 158 X10(3)/MCL (ref 130–400)
PMV BLD AUTO: 11.7 FL (ref 9.4–12.4)
POCT GLUCOSE: 98 MG/DL (ref 70–110)
POTASSIUM SERPL-SCNC: 4.3 MMOL/L (ref 3.5–5.1)
PROT SERPL-MCNC: 8.2 GM/DL (ref 6.4–8.3)
RBC # BLD AUTO: 4.84 X10(6)/MCL (ref 4.7–6.1)
SARS-COV-2 RDRP RESP QL NAA+PROBE: NEGATIVE
SODIUM SERPL-SCNC: 142 MMOL/L (ref 136–145)
WBC # SPEC AUTO: 4.6 X10(3)/MCL (ref 4.5–11.5)

## 2022-06-15 PROCEDURE — 25500020 PHARM REV CODE 255: Performed by: INTERNAL MEDICINE

## 2022-06-15 PROCEDURE — 25000003 PHARM REV CODE 250: Performed by: PHYSICIAN ASSISTANT

## 2022-06-15 PROCEDURE — G0378 HOSPITAL OBSERVATION PER HR: HCPCS

## 2022-06-15 PROCEDURE — 63600175 PHARM REV CODE 636 W HCPCS: Performed by: PHYSICIAN ASSISTANT

## 2022-06-15 PROCEDURE — 87635 SARS-COV-2 COVID-19 AMP PRB: CPT | Performed by: INTERNAL MEDICINE

## 2022-06-15 PROCEDURE — 85025 COMPLETE CBC W/AUTO DIFF WBC: CPT | Performed by: PHYSICIAN ASSISTANT

## 2022-06-15 PROCEDURE — 63600175 PHARM REV CODE 636 W HCPCS: Performed by: INTERNAL MEDICINE

## 2022-06-15 PROCEDURE — 36415 COLL VENOUS BLD VENIPUNCTURE: CPT | Performed by: PHYSICIAN ASSISTANT

## 2022-06-15 PROCEDURE — 80053 COMPREHEN METABOLIC PANEL: CPT | Performed by: PHYSICIAN ASSISTANT

## 2022-06-15 RX ADMIN — IOPAMIDOL 100 ML: 755 INJECTION, SOLUTION INTRAVENOUS at 03:06

## 2022-06-15 RX ADMIN — SODIUM CHLORIDE, POTASSIUM CHLORIDE, SODIUM LACTATE AND CALCIUM CHLORIDE: 600; 310; 30; 20 INJECTION, SOLUTION INTRAVENOUS at 10:06

## 2022-06-15 RX ADMIN — DIATRIZOATE MEGLUMINE AND DIATRIZOATE SODIUM 30 ML: 660; 100 LIQUID ORAL; RECTAL at 03:06

## 2022-06-15 RX ADMIN — ONDANSETRON 4 MG: 2 INJECTION INTRAMUSCULAR; INTRAVENOUS at 02:06

## 2022-06-15 RX ADMIN — PANTOPRAZOLE SODIUM 40 MG: 40 TABLET, DELAYED RELEASE ORAL at 09:06

## 2022-06-15 NOTE — PROGRESS NOTES
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 Trinh with Gastroenterology who presented to [facility] on 6/14/2022 with complaints of persistent dysphagia with an inability to tolerate oral intake and generalized weakness over the past 6 days. He states that he feels the food/liquids getting stuck in his throat when he eats which causes him to regurgitate.  He denied any nausea, vomiting, or abdominal pain and is able to tolerate his secretions.  GI was consulted while he was in the ED who took him for an EGD on 06/14/2022 for a possible food bolus, but during the procedure he was noted to have an esophageal stricture which was dilated.  The information about the procedure was obtained from the ED physician. Hospital Medicine was consulted for admission given the patient's inability to tolerate oral intake and GI will follow along.     Of note, he was seen in the ED on 05/08/2022 with similar symptoms but was able to tolerate oral intake after receiving IV glucagon and famotidine while in the ED so he was discharged home.     His vital signs are currently stable.  His lab showed a bicarbonate of 19, otherwise they were mostly unremarkable.      Interval Hx:   Patient is boarding in the emergency room .  Currently sleeping soundly .  No acute issues reported.  He remains NPO.  The patient had an EGD yesterday revealing extensive compression of the esophagus.  Later that evening he had a CT of the chest abdomen without contrast revealing a pancreas with a multiloculated 6 cystic structure extending from the  pancreas to the GE junction with overall measurement of approximately 11 cm x 8 cm.  Findings likely represent partial obstruction at the GE junction.    Objective/physical exam:  General: in no acute distress  HENT: normocephalic, atraumatic  Eye: PERRL, EOMI, clear conjunctiva  Neck: full ROM, no thyromegaly, no JVD  Respiratory: clear to auscultation bilaterally  Cardiovascular: regular rate and rhythm, no murmur  Gastrointestinal: non-distended, positive bowel sounds, non-tender  Musculoskeletal: no gross deformity  Integumentary: warm, dry, intact, no rashes  Neurological: cranial nerves grossly intact, no focal neurological deficit  Psychiatric: cooperative, flat affect, depressed      VITAL SIGNS: 24 HRS MIN & MAX LAST   Temp  Min: 96.8 °F (36 °C)  Max: 97.9 °F (36.6 °C) 97 °F (36.1 °C)   BP  Min: 95/54  Max: 156/91 (!) 95/54   Pulse  Min: 71  Max: 100  82   Resp  Min: 14  Max: 25 (!) 22   SpO2  Min: 96 %  Max: 100 % 96 %       Recent Labs   Lab 06/14/22  0932 06/15/22  0356   WBC 5.2 4.6   RBC 5.41 4.84   HGB 14.4 13.0*   HCT 44.4 40.4*   MCV 82.1 83.5   MCH 26.6* 26.9*   MCHC 32.4* 32.2*   RDW 13.5 13.3    158   MPV 12.3 11.7       Recent Labs   Lab 06/14/22  0932 06/15/22  0356    142   K 4.2 4.3   CO2 19* 21*   BUN 15.9 13.4   CREATININE 0.86 0.79   CALCIUM 9.3 9.4   ALBUMIN 3.8 3.3*   ALKPHOS 62 54   ALT 7 7   AST 18 15   BILITOT 1.2 0.9          Microbiology Results (last 7 days)     ** No results found for the last 168 hours. **           See below for Radiology    Scheduled Med:   pantoprazole  40 mg Oral Daily        Continuous Infusions:   lactated ringers 75 mL/hr at 06/14/22 1730        PRN Meds:  dextrose 50%, dextrose 50%, glucagon (human recombinant), insulin aspart U-100, ondansetron       Assessment/Plan:  Esophageal dysphagia due to extrinsic compression of the GE junction due to large pancreatic pseudocyst    Patient condition:  Stable    Anticipated discharge and  Disposition:  Discharge home once tolerating oral intake and cleared by Gastroenterology    All diagnosis and differential diagnosis have been reviewed; assessment and plan has been documented; I have personally reviewed the labs and test results that are presently available; I have reviewed the patients medication list; I have reviewed the consulting providers response and recommendations. I have reviewed or attempted to review medical records based upon their availability    All of the patient's questions have been  addressed and answered. Patient's is agreeable to the above stated plan. I will continue to monitor closely and make adjustments to medical management as needed.  _____________________________________________________________________    Nutrition Status:    Radiology:  CT Chest Abdomen Without Contrast (XPD)  Narrative: EXAMINATION:  CT CHEST ABDOMEN WITHOUT CONTRAST (XPD)    CLINICAL HISTORY:  external compression at GE junction resulting in dysphagia; concern for pancreatic etiologist/cyst;    TECHNIQUE:  Axial images of the chest, abdomen, and pelvis were obtained without contrast. Sagittal and coronal reconstructed images were available for review.    Automatic exposure control was utilized to reduce the patient's radiation dose.    DLP = 1170    COMPARISON:  No prior images available for comparison.    FINDINGS:  AORTA: Limited evaluation secondary to lack of IV contrast.  Grossly normal in course and caliber.    HEART: Normal size. No pericardial effusion.    THYROID GLAND: The thyroid is not enlarged. There are no nodules identified.    AIRWAYS: Trachea is midline and tracheobronchial tree is patent.    LUNGS: Trace subsegmental atelectatic changes at the left base with small left effusion.    THROACIC LYMPH NODES: There is no significant mediastinal, axillary or hilar lymphadenopathy.    HEPATOBILIARY: No focal hepatic lesion is identified, status post cholecystectomy.    SPLEEN:  Normal    PANCREAS: Cicatrizing appearance of the pancreas with multiloculated cystic structure extending from the pancreas to the GE junction within overall measurement of approximately 11 by 8 cm.  There is additional peripherally calcified cystic lesion adjacent to the small bowel within the left upper quadrant measuring 7 cm.    ADRENALS: No adrenal nodules.    KIDNEYS: The right kidney demonstrates no stone, hydronephrosis, or hydroureter. No focal mass identified. The left kidney demonstrates no stone, hydronephrosis, or hydroureter. No focal mass identified.    ABDOMINAL LYMPHADENOPATHY/RETROPERITONEUM: There is no retroperitoneal lymphadenopathy.    BOWEL: No acute bowel related abnormalities. No evidence of appendiceal inflammation.    PELVIC VISCERA: Normal. No pelvic mass.    PELVIC LYMPH NODES: No lymphadenopathy.    PERITONEUM/ BODY WALL: No ascites or implant.  Drainage catheter is noted within the upper abdomen.    SKELETAL: No aggressive appearing lytic/blastic lesion. No acute fractures, subluxations or dislocations.  Impression: Cicatrizing appearance of the pancreas with multiloculated cystic structure extending from the pancreas to the GE junction within overall measurement of approximately 11 by 8 cm.  There is additional peripherally calcified cystic lesion adjacent to the small bowel within the left upper quadrant measuring 7 cm.  Recommend comparison to prior imaging.  Findings believed related to prior pancreatic surgery.  Of note contrast is noted in the stomach.  The contrast is dense within the esophagus.  Findings likely represent partial obstruction at the GE junction.    Electronically signed by: Dmitriy Carnes  Date:    06/14/2022  Time:    18:48      Fadi Murcia MD   06/15/2022

## 2022-06-15 NOTE — ANESTHESIA POSTPROCEDURE EVALUATION
Anesthesia Post Evaluation    Patient: Tato Schmidt    Procedure(s) Performed: Procedure(s) (LRB):  EGD Food Bolus (N/A)  ESOPHAGOSCOPY, USING BOUGIE, WITH DILATION (N/A)  EGD, WITH CLOSED BIOPSY (N/A)    Final Anesthesia Type: general      Patient location during evaluation: floor  Patient participation: Yes- Able to Participate  Level of consciousness: awake  Post-procedure vital signs: reviewed and stable  Pain management: adequate  Airway patency: patent    PONV status at discharge: vomiting (controlled) and nausea (inadequately controlled)  Anesthetic complications: no      Cardiovascular status: hemodynamically stable  Respiratory status: spontaneous ventilation and unassisted  Hydration status: euvolemic  Follow-up not needed.  Comments:              Vitals Value Taken Time   /69 06/14/22 1600   Temp 36 °C (96.8 °F) 06/14/22 1521   Pulse 85 06/14/22 1600   Resp 21 06/14/22 1600   SpO2 100 % 06/14/22 1600         Event Time   Out of Recovery 15:23:48         Pain/Carrillo Score: Carrillo Score: 8 (6/14/2022  3:37 PM)

## 2022-06-15 NOTE — PROGRESS NOTES
"Gastroenterology Progress Note    Subjective/Interval History:  Tried to drink sprite yesterday after procedure and was unable to tolerate.  Was able to hold down pills this AM.  No new complaints or issues. Continues to deny abdominal pain.     ROS:    Review of Systems   Constitutional: Negative for fever, malaise/fatigue and weight loss.   Respiratory: Negative for cough and shortness of breath.    Cardiovascular: Negative for chest pain, palpitations and leg swelling.   Gastrointestinal: Negative for abdominal pain, blood in stool, constipation, diarrhea, heartburn, melena, nausea and vomiting.   Musculoskeletal: Negative for back pain and myalgias.   Skin: Negative for rash.   Neurological: Negative for speech change and focal weakness.         Vital Signs:  /66   Pulse 80   Temp 97 °F (36.1 °C) (Oral)   Resp 19   Ht 5' 10" (1.778 m)   Wt 92.5 kg (204 lb)   SpO2 96%   BMI 29.27 kg/m²   Body mass index is 29.27 kg/m².    Physical Exam:    Physical Exam  Constitutional:       General: He is not in acute distress.     Appearance: He is not ill-appearing.   HENT:      Head: Normocephalic and atraumatic.   Eyes:      General: No scleral icterus.     Extraocular Movements: Extraocular movements intact.   Cardiovascular:      Rate and Rhythm: Normal rate and regular rhythm.   Pulmonary:      Effort: Pulmonary effort is normal. No respiratory distress.   Abdominal:      General: Bowel sounds are normal. There is no distension.      Palpations: Abdomen is soft. There is no mass.      Tenderness: There is no abdominal tenderness. There is no guarding or rebound.   Musculoskeletal:      Right lower leg: No edema.      Left lower leg: No edema.   Skin:     General: Skin is warm and dry.      Coloration: Skin is not jaundiced.   Neurological:      Mental Status: He is alert and oriented to person, place, and time.   Psychiatric:         Mood and Affect: Mood normal.         Behavior: Behavior normal. "         Labs:  Recent Results (from the past 48 hour(s))   Comprehensive metabolic panel    Collection Time: 06/14/22  9:32 AM   Result Value Ref Range    Sodium Level 138 136 - 145 mmol/L    Potassium Level 4.2 3.5 - 5.1 mmol/L    Chloride 106 98 - 107 mmol/L    Carbon Dioxide 19 (L) 22 - 29 mmol/L    Glucose Level 118 (H) 74 - 100 mg/dL    Blood Urea Nitrogen 15.9 8.4 - 25.7 mg/dL    Creatinine 0.86 0.73 - 1.18 mg/dL    Calcium Level Total 9.3 8.4 - 10.2 mg/dL    Protein Total 8.6 (H) 6.4 - 8.3 gm/dL    Albumin Level 3.8 3.5 - 5.0 gm/dL    Globulin 4.8 (H) 2.4 - 3.5 gm/dL    Albumin/Globulin Ratio 0.8 (L) 1.1 - 2.0 ratio    Bilirubin Total 1.2 <=1.5 mg/dL    Alkaline Phosphatase 62 40 - 150 unit/L    Alanine Aminotransferase 7 0 - 55 unit/L    Aspartate Aminotransferase 18 5 - 34 unit/L    Estimated GFR- >60 mls/min/1.73/m2   CBC with Differential    Collection Time: 06/14/22  9:32 AM   Result Value Ref Range    WBC 5.2 4.5 - 11.5 x10(3)/mcL    RBC 5.41 4.70 - 6.10 x10(6)/mcL    Hgb 14.4 14.0 - 18.0 gm/dL    Hct 44.4 42.0 - 52.0 %    MCV 82.1 80.0 - 94.0 fL    MCH 26.6 (L) 27.0 - 31.0 pg    MCHC 32.4 (L) 33.0 - 36.0 mg/dL    RDW 13.5 11.5 - 17.0 %    Platelet 203 130 - 400 x10(3)/mcL    MPV 12.3 9.4 - 12.4 fL    IG# 0.02 (H) 0 - 0.0155 x10(3)/mcL    IG% 0.4 0 - 0.43 %    NRBC% 0.0 %   Manual Differential    Collection Time: 06/14/22  9:32 AM   Result Value Ref Range    Neut Man 74 %    Lymph Man 10 %    Monocyte Man 11 %    Eos Man 1 %    Basophil Man 4 %    Instr WBC 5.2 x10(3)/mcL    Abs Mono 0.572 0.1 - 1.3 x10(3)/mcL    Abs Eos  0.052 0 - 0.9 x10(3)/mcL    Abs Baso 0.208 (H) 0 - 0.2 x10(3)/mcL    Abs Lymp 0.52 (L) 0.6 - 4.6 x10(3)/mcL    Abs Neut 3.848 2.1 - 9.2 x10(3)/mcL    RBC Morph Normal Normal    Platelet Est Adequate Normal, Adequate   POCT glucose    Collection Time: 06/14/22  1:57 PM   Result Value Ref Range    POCT Glucose 101 70 - 110 mg/dL   POCT glucose    Collection Time: 06/14/22   7:17 PM   Result Value Ref Range    POCT Glucose 130 (H) 70 - 110 mg/dL   Urinalysis, Reflex to Urine Culture Urine, Clean Catch    Collection Time: 06/14/22  8:31 PM    Specimen: Urine   Result Value Ref Range    Color, UA Yellow Yellow, Colorless, Other, Clear    Appearance, UA Clear Clear    Specific Gravity, UA 1.022 1.001 - 1.030    pH, UA 5.5 5.0, 5.5, 6.0, 6.5, 7.0, 7.5, 8.0, 8.5    Protein, UA 1+ (A) Negative, 300  mg/dL    Glucose, UA 3+ (A) Negative, Normal mg/dL    Ketones, UA 3+ (A) Negative, +1, +2, +3, +4, +5, >=160, >=80 mg/dL    Blood, UA Negative Negative unit/L    Bilirubin, UA Negative Negative mg/dL    Urobilinogen, UA 1.0 0.2, 1.0, Normal mg/dL    Nitrites, UA Negative Negative    Leukocyte Esterase, UA Negative Negative, 75  unit/L   Urinalysis, Microscopic    Collection Time: 06/14/22  8:31 PM   Result Value Ref Range    RBC, UA <5 <=5 /HPF    WBC, UA <5 <=5 /HPF    Squamous Epithelial Cells, UA <4 <=4 /LPF    Bacteria, UA None Seen None Seen, Rare, Occasional /HPF   CBC with Differential    Collection Time: 06/15/22  3:56 AM   Result Value Ref Range    WBC 4.6 4.5 - 11.5 x10(3)/mcL    RBC 4.84 4.70 - 6.10 x10(6)/mcL    Hgb 13.0 (L) 14.0 - 18.0 gm/dL    Hct 40.4 (L) 42.0 - 52.0 %    MCV 83.5 80.0 - 94.0 fL    MCH 26.9 (L) 27.0 - 31.0 pg    MCHC 32.2 (L) 33.0 - 36.0 mg/dL    RDW 13.3 11.5 - 17.0 %    Platelet 158 130 - 400 x10(3)/mcL    MPV 11.7 9.4 - 12.4 fL    Neut % 70.6 %    Lymph % 13.1 %    Mono % 15.3 %    Eos % 0.2 %    Basophil % 0.4 %    Lymph # 0.60 0.6 - 4.6 x10(3)/mcL    Neut # 3.2 2.1 - 9.2 x10(3)/mcL    Mono # 0.70 0.1 - 1.3 x10(3)/mcL    Eos # 0.01 0 - 0.9 x10(3)/mcL    Baso # 0.02 0 - 0.2 x10(3)/mcL    IG# 0.02 (H) 0 - 0.0155 x10(3)/mcL    IG% 0.4 0 - 0.43 %    NRBC% 0.0 %   Comprehensive Metabolic Panel    Collection Time: 06/15/22  3:56 AM   Result Value Ref Range    Sodium Level 142 136 - 145 mmol/L    Potassium Level 4.3 3.5 - 5.1 mmol/L    Chloride 108 (H) 98 - 107 mmol/L     Carbon Dioxide 21 (L) 22 - 29 mmol/L    Glucose Level 97 74 - 100 mg/dL    Blood Urea Nitrogen 13.4 8.4 - 25.7 mg/dL    Creatinine 0.79 0.73 - 1.18 mg/dL    Calcium Level Total 9.4 8.4 - 10.2 mg/dL    Protein Total 8.2 6.4 - 8.3 gm/dL    Albumin Level 3.3 (L) 3.5 - 5.0 gm/dL    Globulin 4.9 (H) 2.4 - 3.5 gm/dL    Albumin/Globulin Ratio 0.7 (L) 1.1 - 2.0 ratio    Bilirubin Total 0.9 <=1.5 mg/dL    Alkaline Phosphatase 54 40 - 150 unit/L    Alanine Aminotransferase 7 0 - 55 unit/L    Aspartate Aminotransferase 15 5 - 34 unit/L    Estimated GFR- >60 mls/min/1.73/m2         Assessment/Plan:  56 yo AAM unknown to our group.  He is previously known to the gastro clinic and has been fired.  Patient with a pmhx of DM2, gallstone pancreatitis with pancreatic duct fistula and cyst s/p pancreatic stent s/p removal and laveen shunt all in 2010, cholecystectomy.  Here with c/o dysphagia.     1. Dysphagia to solids and liquids - due to extrinsic compression from pancreatic cyst  2. Pancreatic cyst    s/p EGD 6/15/22: Extrinsic compression of the lower esophagus (bxed but not suspected to be significant findings). Dilated empirically to 50 fr. Moderate hemorrhogic gastritis, bxed. Moderate extrinsic compression of the duodenal sweep/bulb. Unable to get passed with standard gastroscope, exchanged for the colonoscopy and were able to eventually get through.   CT chest/abdomen wo contrast 6/14/22: Cicatrizing appearance of pancreas with multiloculated cystic structure extending from pancreas to GE junction within overall measurement of approximately 11 by 8 cm.  There is additional peripherally calcified cystic lesion adjacent to the small bowel within left upper quadrant measuring 7 cm.  Oral contrast is noted in stomach but is dense within the esophagus and findings likely represent partial obstruction at the GE junction.    - Doubt dilation will have any benefit or lasting benefit.  - PPI daily.   - Consult   [facility] for EUS and possible drainage.            Jasmin Arthur PA-C

## 2022-06-15 NOTE — ED NOTES
Pt is aox4 able to move all extremities freely, updated pt on poc, npo status at midnight he is in agreement, gave pt a spirite zero to see if patient can tolerate po urinal at bedside, fluids infusing pt on monitor call bell within reach

## 2022-06-16 LAB
POCT GLUCOSE: 100 MG/DL (ref 70–110)
POCT GLUCOSE: 102 MG/DL (ref 70–110)
POCT GLUCOSE: 93 MG/DL (ref 70–110)
POCT GLUCOSE: 97 MG/DL (ref 70–110)

## 2022-06-16 PROCEDURE — 63600175 PHARM REV CODE 636 W HCPCS: Performed by: NURSE PRACTITIONER

## 2022-06-16 PROCEDURE — 11000001 HC ACUTE MED/SURG PRIVATE ROOM

## 2022-06-16 PROCEDURE — 63600175 PHARM REV CODE 636 W HCPCS: Performed by: INTERNAL MEDICINE

## 2022-06-16 PROCEDURE — C9113 INJ PANTOPRAZOLE SODIUM, VIA: HCPCS | Performed by: NURSE PRACTITIONER

## 2022-06-16 RX ORDER — PANTOPRAZOLE SODIUM 40 MG/10ML
40 INJECTION, POWDER, LYOPHILIZED, FOR SOLUTION INTRAVENOUS DAILY
Status: DISCONTINUED | OUTPATIENT
Start: 2022-06-16 | End: 2022-06-17

## 2022-06-16 RX ADMIN — PANTOPRAZOLE SODIUM 40 MG: 40 INJECTION, POWDER, FOR SOLUTION INTRAVENOUS at 02:06

## 2022-06-16 RX ADMIN — SODIUM CHLORIDE, POTASSIUM CHLORIDE, SODIUM LACTATE AND CALCIUM CHLORIDE: 600; 310; 30; 20 INJECTION, SOLUTION INTRAVENOUS at 09:06

## 2022-06-16 NOTE — PROGRESS NOTES
[facility] - Observation Unit  Adult Nutrition  Progress Note    SUMMARY       Recommendations    Recommendation/Intervention:   1. Diet advancement as medically appropriate per GI/MD to goal: diabetic.   2. Once appropriate for oral diet, add strawberry Boost Glucose Control TID for additional nutrition (250kcals, 14g protein).   3. If unable to tolerate oral diet, consider enteral vs parenteral for nutrition support.   -TF recs: Diabetisource @ 85ml/hr will provide: 2040kcal (98% est needs), 102g protein (94% est needs), 1387ml free water (67% est needs).  -PPN recs: Clinimix-E 4.25/5 @ 90ml/hr will provide: 734kcals (35% est needs),  92g protein (85% est needs), 2160ml free water.      Assessment and Plan  Nutrition Problem  Malnutrition    Related to (etiology):   Dysphagia/pancreatic cyst    Signs and Symptoms (as evidenced by):   <50% >/=5 days, >2% wt loss x1wk     Interventions(treatment strategy):  Modified composition of meals / snacks, Commercial beverage and Collaboration with other providers    Nutrition Diagnosis Status:   New    Goals: 1. Maintain wt throughout hospitalization. 2. Meet >75% nutritional needs by follow-up.  Nutrition Goal Status: new      Malnutrition Assessment  Malnutrition Type: acute illness or injury (moderate)  Energy Intake: moderate energy intake          Weight Loss (Malnutrition): greater than 2% in 1 week  Energy Intake (Malnutrition): less than or equal to 50% for greater than or equal to 5 days  Subcutaneous Fat (Malnutrition):  (does not meet criteria)  Muscle Mass (Malnutrition):  (does not meet criteria)  Fluid Accumulation (Malnutrition):  (does not meet criteria)     Reason for Assessment    Reason For Assessment:  identified at risk by screening criteria  Diagnosis:    1. Dysphagia to solids and liquids - due to extrinsic compression from pancreatic cyst    2. 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 States reduced po intake x1 wk s/t worsening dysphagia; solids and liquids unable to stay down. With ~7lb wt loss. NPO today for procedure. Will leave TF and PPN recommendations incase unable to advance diet.    Nutrition Risk Screen    Nutrition Risk Screen: dysphagia or difficulty swallowing    Nutrition/Diet History    Factors Affecting Nutritional Intake: nausea/vomiting, altered gastrointestinal function, difficulty/impaired swallowing    Anthropometrics    Temp: 98 °F (36.7 °C)  Height Method: Stated  Height: (P) 5' 10" (177.8 cm)  Height (inches): (P) 70 in  Weight Method: Standard Scale  Weight: (P) 90.1 kg (198 lb 10.2 oz)  Weight (lb): (P) 198.64 lb  Ideal Body Weight (IBW), Male: (P) 166 lb  % Ideal Body Weight, Male (lb): (P) 119.66 %  BMI (Calculated): (P) 28.5  BMI Grade: (P) 25 - 29.9 - overweight  Usual Body Weight (UBW), kg: (P) 93.18 kg  Weight Change Amount: (P) 7 lb  % Usual Body Weight: (P) 96.9  % Weight Change From Usual Weight: (P) -3.31 %       Lab/Procedures/Meds    Pertinent Labs Reviewed: (P) reviewed  Pertinent Labs Comments: (P) 6/15: Cl 108H, CO2 21L, Alb 3.3L  Pertinent Medications Reviewed: (P) reviewed  Pertinent Medications Comments: (P) protonix, LR @ 125ml/hr, zofran    Estimated/Assessed Needs    Weight Used For Calorie Calculations: 90.1 kg (198 lb 10.2 oz)  Energy Calorie Requirements (kcal): 2072kcals/d (MSJ x 1.2SF)  Energy Need Method: [place]  Protein Requirements: 108g/d (1.2g/kg)  Weight Used For Protein Calculations: 90.1 kg (198 lb 10.2 oz)  Fluid Requirements (mL): 2072ml fl/d  Estimated Fluid Requirement Method: RDA Method  RDA Method (mL): 2072    Nutrition Prescription Ordered    Current Diet Order: NPO    Evaluation of Received " Nutrient/Fluid Intake    Tolerance: not tolerating  % Intake of Estimated Energy Needs: 0 - 25 %  % Meal Intake: NPO    Nutrition Risk    Level of Risk/Frequency of Follow-up: high     Monitor and Evaluation    Food and Nutrient Intake: food and beverage intake, energy intake  Food and Nutrient Adminstration: diet order  Anthropometric Measurements: weight change  Biochemical Data, Medical Tests and Procedures: glucose/endocrine profile, electrolyte and renal panel     Nutrition Follow-Up    RD Follow-up?: Yes

## 2022-06-16 NOTE — PROGRESS NOTES
Progress Note         Hospital follow up  CC:    Subjective:     Pt not tolerating liquids but resting comfortably    Review of Systems:     Review of Systems   Gastrointestinal: Positive for abdominal pain, nausea and vomiting.   All other systems reviewed and are negative.      Objective:   Scheduled Meds:   pantoprazole  40 mg Intravenous Daily     Continuous Infusions:   lactated ringers 125 mL/hr at 06/16/22 0918     PRN Meds:  dextrose 50%, dextrose 50%, glucagon (human recombinant), insulin aspart U-100, ondansetron      VITAL SIGNS: 24 HR MIN & MAX LAST    Temp  Min: 97.8 °F (36.6 °C)  Max: 98 °F (36.7 °C)  98 °F (36.7 °C)        BP  Min: 108/67  Max: 134/83  110/73     Pulse  Min: 67  Max: 105  77     Resp  Min: 14  Max: 24  18   SpO2  Min: 95 %  Max: 100 %  98 %      No intake or output data in the 24 hours ending 06/16/22 1458    Physical Exam  Vitals and nursing note reviewed.   Constitutional:       General: He is not in acute distress.     Appearance: Normal appearance. He is not ill-appearing.   HENT:      Head: Normocephalic and atraumatic.      Nose: Nose normal.      Mouth/Throat:      Mouth: Mucous membranes are moist.   Eyes:      General: No scleral icterus.     Extraocular Movements: Extraocular movements intact.      Conjunctiva/sclera: Conjunctivae normal.   Cardiovascular:      Rate and Rhythm: Normal rate and regular rhythm.      Heart sounds: Normal heart sounds. No murmur heard.  Pulmonary:      Effort: Pulmonary effort is normal. No respiratory distress.      Breath sounds: Normal breath sounds.   Abdominal:      General: Abdomen is flat. Bowel sounds are normal. There is no distension.      Palpations: Abdomen is soft. There is no mass.      Tenderness: There is no abdominal tenderness. There is no guarding or rebound.      Hernia: No hernia is present.   Musculoskeletal:         General: Normal range of motion.      Right lower leg: No edema.      Left lower leg: No edema.   Skin:      General: Skin is warm and dry.   Neurological:      General: No focal deficit present.      Mental Status: He is alert and oriented to person, place, and time.   Psychiatric:         Mood and Affect: Mood normal.         Behavior: Behavior normal.         Thought Content: Thought content normal.            Recent Results (from the past 24 hour(s))   POCT glucose    Collection Time: 06/15/22 10:06 PM   Result Value Ref Range    POCT Glucose 98 70 - 110 mg/dL   COVID-19 Rapid Screening    Collection Time: 06/15/22 10:37 PM   Result Value Ref Range    SARS COV-2 MOLECULAR Negative Negative   POCT glucose    Collection Time: 06/16/22  4:43 AM   Result Value Ref Range    POCT Glucose 93 70 - 110 mg/dL   POCT glucose    Collection Time: 06/16/22  1:02 PM   Result Value Ref Range    POCT Glucose 100 70 - 110 mg/dL       CT Abdomen Pelvis With Contrast    Result Date: 6/15/2022  EXAMINATION: CT ABDOMEN PELVIS WITH CONTRAST CLINICAL HISTORY: Abdominal abscess/infection suspected;Pancreatic mass/pseudocyst; TECHNIQUE: Helical acquisition through the abdomen and pelvis with IV and enteric contrast.  Three plane reconstructions were provided for review.  mGycm. Automatic exposure control, adjustment of mA/kV or iterative reconstruction technique was used to reduce radiation. COMPARISON: 14 June 2022 19 November 2019 FINDINGS: There is a small left pleural effusion.  There is left basilar atelectasis. There is organized collection extending superiorly from the pancreatic tail through the diaphragmatic hiatus around the distal esophagus.  This measures approximately 10 x 8 x 6 cm.  The largest component is medial to the gastric fundus.  Collection appears to be septated superiorly.  It is possible this communicates with the main pancreatic duct.  Collection present going back to 2019 but is significantly larger.  The pancreatic body is irregular and atrophic.  Main pancreatic duct is dilated in the tail.  Similar  irregularity of the pancreatic fat compared to 2019 likely sequela of chronic inflammation. Minimal intrahepatic biliary ductal dilatation in the left lobe.  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  The gallbladder is surgically absent.  No significant abnormality of the spleen, adrenals or kidneys. There is no bowel obstruction.  No significant inflammatory changes of the bowel.  There is a stable peripherally calcified peritoneal collection left abdomen image 38 series 8 measuring up to almost 7 cm. The urinary bladder is unremarkable.  No pelvic free fluid.  The abdominal aorta is normal in caliber.  No retroperitoneal adenopathy. Mild degenerative changes of the spine.  No acute osseous findings.     1.  Collection extending from the pancreatic tail superiorly through the diaphragmatic hiatus significantly larger compared to 2019.  This may communicate with the main pancreatic duct. 2.  Left peritoneal peripherally calcified collection stable going back to 2019. Electronically signed by: Blair Rodriguez Date:    06/15/2022 Time:    16:10    CT Chest Abdomen Without Contrast (XPD)    Result Date: 6/14/2022  EXAMINATION: CT CHEST ABDOMEN WITHOUT CONTRAST (XPD) CLINICAL HISTORY: external compression at GE junction resulting in dysphagia; concern for pancreatic etiologist/cyst; TECHNIQUE: Axial images of the chest, abdomen, and pelvis were obtained without contrast. Sagittal and coronal reconstructed images were available for review. Automatic exposure control was utilized to reduce the patient's radiation dose. DLP = 1170 COMPARISON: No prior images available for comparison. FINDINGS: AORTA: Limited evaluation secondary to lack of IV contrast.  Grossly normal in course and caliber. HEART: Normal size. No pericardial effusion. THYROID GLAND: The thyroid is not enlarged. There are no nodules identified. AIRWAYS: Trachea is midline and tracheobronchial tree is patent. LUNGS: Trace  subsegmental atelectatic changes at the left base with small left effusion. THROACIC LYMPH NODES: There is no significant mediastinal, axillary or hilar lymphadenopathy. HEPATOBILIARY: No focal hepatic lesion is identified, status post cholecystectomy. SPLEEN: Normal PANCREAS: Cicatrizing appearance of the pancreas with multiloculated cystic structure extending from the pancreas to the GE junction within overall measurement of approximately 11 by 8 cm.  There is additional peripherally calcified cystic lesion adjacent to the small bowel within the left upper quadrant measuring 7 cm. ADRENALS: No adrenal nodules. KIDNEYS: The right kidney demonstrates no stone, hydronephrosis, or hydroureter. No focal mass identified. The left kidney demonstrates no stone, hydronephrosis, or hydroureter. No focal mass identified. ABDOMINAL LYMPHADENOPATHY/RETROPERITONEUM: There is no retroperitoneal lymphadenopathy. BOWEL: No acute bowel related abnormalities. No evidence of appendiceal inflammation. PELVIC VISCERA: Normal. No pelvic mass. PELVIC LYMPH NODES: No lymphadenopathy. PERITONEUM/ BODY WALL: No ascites or implant.  Drainage catheter is noted within the upper abdomen. SKELETAL: No aggressive appearing lytic/blastic lesion. No acute fractures, subluxations or dislocations.     Cicatrizing appearance of the pancreas with multiloculated cystic structure extending from the pancreas to the GE junction within overall measurement of approximately 11 by 8 cm.  There is additional peripherally calcified cystic lesion adjacent to the small bowel within the left upper quadrant measuring 7 cm.  Recommend comparison to prior imaging.  Findings believed related to prior pancreatic surgery.  Of note contrast is noted in the stomach.  The contrast is dense within the esophagus.  Findings likely represent partial obstruction at the GE junction. Electronically signed by: Dmitriy Carnes Date:    06/14/2022 Time:    18:48      Imaging  personally reviewed by myself and SP.    Assessment / Plan:           Dr. Carlson for EUS tomorrow  Lauren Spears PA-C  [facility]  [phone]

## 2022-06-16 NOTE — CLINICAL REVIEW
57-year-old male admitted to acute care setting with dysphagia, hypokalemia.  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  The patient was also found to have partial obstruction at the GE junction related to a pseudocyst.  The patient had an EGD which demonstrated extrinsic compression related to the same.  As of 06/16/2022, the patient was not tolerating oral food/medications.  He continued on aggressive IV hydration.  Based on the MCG 25th Edition: M-550-RRG GLOS 1 (DS) Esophageal Disease RRG  [Version: MCG 25th Edition], admission is appropriate for inability to maintain oral hydration persisting despite observation care.  The IP LOC is appropriate in this instance.  I reached out to Dr. Fadi Murcia of 06/15/2022 at 4:30 p.m. central time who agreed to IP LOC    Michael Monique MD  Utilization Management  Physician Advisor

## 2022-06-16 NOTE — CONSULTS
Gastroenterology Consultation Note    Reason for Consult:  The primary encounter diagnosis was Dehydration. A diagnosis of Dysphagia, unspecified type was also pertinent to this visit.    PCP:   Daniel Vela MD    Referring MD:  Franc Pal MD    Hospital Day: 0     Initial History of Present Illness (HPI):  This is a 57 y.o. male presented to ER with dysphagia.   Dysphagia has been an issue since early May.  EGD 6/15/22: Extrinsic compression of the lower esophagus (bxed but not suspected to be significant findings). Dilated empirically to 50 fr. Moderate hemorrhogic gastritis, bxed. Moderate extrinsic compression of the duodenal sweep/bulb. Unable to get passed with standard gastroscope, exchanged for the colonoscopy and were able to eventually get through.  CT chest/abdomen wo contrast 6/14/22: Cicatrizing appearance of pancreas with multiloculated cystic structure extending from pancreas to GE junction within overall measurement of approximately 11 by 8 cm.  There is additional peripherally calcified cystic lesion adjacent to the small bowel within left upper quadrant measuring 7 cm.  Oral contrast is noted in stomach but is dense within the esophagus and findings likely represent partial obstruction at the GE junction.  Dr. Carlson is consulted for possible EUS evaluation and drainage.  He has asked Dr. Federico Goyal to evaluate as well.    Seen in past (2019) by Dr. Carlson for dysphagia.  EGD was ordered, scheduled several times but either rescheduled or cancelled and never accomplished.      Seen in 2010 by Dr. Panchito Trinh for gallstone pancreatitis  ERCP: 2010: Extrinsic compression of stomach. 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 Pancreatic sphincterotomy was accomplished.  He was sent to Dr. Dread Galloway at [place] for placement of a pancreatic stent . Stent was removed after patient clinically improved.  CT in 2011 revealed  pancreatic pseudocysts. These were drained.  Was lost to follow up until 2019.    Today: NPO and refused oral meds. No nausea and mild generalized abdominal tenderness.     ROS:  Constitutional: Negative for chills, diaphoresis and fever.   HENT: Positive for trouble swallowing. Negative for congestion and sinus pain.    Eyes: Negative for pain, discharge and visual disturbance.   Respiratory: Negative for wheezing and stridor.    Cardiovascular: Negative for palpitations.   Gastrointestinal: Positive for mild generalized tenderness. Negative for constipation and rectal pain.   Genitourinary: Negative for dysuria and hematuria.   Musculoskeletal: Negative for back pain and myalgias.   Neurological: Negative for dizziness, syncope and numbness.   Hematological: Negative.    Psychiatric/Behavioral: Negative.    All other systems reviewed and are negative.    Medical History:   Past Medical History:   Diagnosis Date    Diabetes mellitus     Pancreatitis        Surgical History:   Past Surgical History:   Procedure Laterality Date    CHOLECYSTECTOMY      PANCREAS SURGERY     Neck surgery  [place] shunt placement 2011 by Dr. Medina at [facility]    Family History:   Father: MI.  at age 75.  Mother: Breast cancer.  at age 74.     Social History:   Social History     Tobacco Use    Smoking status: Never Smoker    Smokeless tobacco: Never Used   Substance Use Topics    Alcohol use: No       Allergies:  Review of patient's allergies indicates:  No Known Allergies    Medications Prior to Admission   Medication Sig Dispense Refill Last Dose    dulaglutide (TRULICITY SUBQ) Inject into the skin.   Past Week at Unknown time    empagliflozin (JARDIANCE) 25 mg tablet Take 25 mg by mouth once daily.   2022 at Unknown time    metFORMIN (GLUCOPHAGE) 500 MG tablet Take 500 mg by mouth 2 (two) times daily with meals.   2022         Objective Findings:    Vital Signs:  /71   Pulse 76   Temp 97.9 °F  "(36.6 °C) (Oral)   Resp 18   Ht 5' 10" (1.778 m)   Wt 90.1 kg (198 lb 10.2 oz)   SpO2 96%   BMI 28.50 kg/m²   Body mass index is 28.5 kg/m².    Physical Exam:  General: in no acute distress. Up at bedside  HENT: normocephalic, atraumatic  Eye: EOMI, clear conjunctiva, anicteric  Neck: full ROM, no thyromegaly, no JVD  Respiratory: clear to auscultation bilaterally  Cardiovascular: regular rate and rhythm, no murmur  Gastrointestinal: non-distended, positive bowel sounds, mild generalized tenderness  Musculoskeletal: no gross deformity  Integumentary: warm, dry, intact, no rashes  Neurological: cranial nerves grossly intact, no focal neurological deficit  Psychiatric: cooperative, calm    Labs:  Recent Results (from the past 48 hour(s))   POCT glucose    Collection Time: 06/14/22  1:57 PM   Result Value Ref Range    POCT Glucose 101 70 - 110 mg/dL   POCT glucose    Collection Time: 06/14/22  7:17 PM   Result Value Ref Range    POCT Glucose 130 (H) 70 - 110 mg/dL   Urinalysis, Reflex to Urine Culture Urine, Clean Catch    Collection Time: 06/14/22  8:31 PM    Specimen: Urine   Result Value Ref Range    Color, UA Yellow Yellow, Colorless, Other, Clear    Appearance, UA Clear Clear    Specific Gravity, UA 1.022 1.001 - 1.030    pH, UA 5.5 5.0, 5.5, 6.0, 6.5, 7.0, 7.5, 8.0, 8.5    Protein, UA 1+ (A) Negative, 300  mg/dL    Glucose, UA 3+ (A) Negative, Normal mg/dL    Ketones, UA 3+ (A) Negative, +1, +2, +3, +4, +5, >=160, >=80 mg/dL    Blood, UA Negative Negative unit/L    Bilirubin, UA Negative Negative mg/dL    Urobilinogen, UA 1.0 0.2, 1.0, Normal mg/dL    Nitrites, UA Negative Negative    Leukocyte Esterase, UA Negative Negative, 75  unit/L   Urinalysis, Microscopic    Collection Time: 06/14/22  8:31 PM   Result Value Ref Range    RBC, UA <5 <=5 /HPF    WBC, UA <5 <=5 /HPF    Squamous Epithelial Cells, UA <4 <=4 /LPF    Bacteria, UA None Seen None Seen, Rare, Occasional /HPF   CBC with Differential    Collection " Time: 06/15/22  3:56 AM   Result Value Ref Range    WBC 4.6 4.5 - 11.5 x10(3)/mcL    RBC 4.84 4.70 - 6.10 x10(6)/mcL    Hgb 13.0 (L) 14.0 - 18.0 gm/dL    Hct 40.4 (L) 42.0 - 52.0 %    MCV 83.5 80.0 - 94.0 fL    MCH 26.9 (L) 27.0 - 31.0 pg    MCHC 32.2 (L) 33.0 - 36.0 mg/dL    RDW 13.3 11.5 - 17.0 %    Platelet 158 130 - 400 x10(3)/mcL    MPV 11.7 9.4 - 12.4 fL    Neut % 70.6 %    Lymph % 13.1 %    Mono % 15.3 %    Eos % 0.2 %    Basophil % 0.4 %    Lymph # 0.60 0.6 - 4.6 x10(3)/mcL    Neut # 3.2 2.1 - 9.2 x10(3)/mcL    Mono # 0.70 0.1 - 1.3 x10(3)/mcL    Eos # 0.01 0 - 0.9 x10(3)/mcL    Baso # 0.02 0 - 0.2 x10(3)/mcL    IG# 0.02 (H) 0 - 0.0155 x10(3)/mcL    IG% 0.4 0 - 0.43 %    NRBC% 0.0 %   Comprehensive Metabolic Panel    Collection Time: 06/15/22  3:56 AM   Result Value Ref Range    Sodium Level 142 136 - 145 mmol/L    Potassium Level 4.3 3.5 - 5.1 mmol/L    Chloride 108 (H) 98 - 107 mmol/L    Carbon Dioxide 21 (L) 22 - 29 mmol/L    Glucose Level 97 74 - 100 mg/dL    Blood Urea Nitrogen 13.4 8.4 - 25.7 mg/dL    Creatinine 0.79 0.73 - 1.18 mg/dL    Calcium Level Total 9.4 8.4 - 10.2 mg/dL    Protein Total 8.2 6.4 - 8.3 gm/dL    Albumin Level 3.3 (L) 3.5 - 5.0 gm/dL    Globulin 4.9 (H) 2.4 - 3.5 gm/dL    Albumin/Globulin Ratio 0.7 (L) 1.1 - 2.0 ratio    Bilirubin Total 0.9 <=1.5 mg/dL    Alkaline Phosphatase 54 40 - 150 unit/L    Alanine Aminotransferase 7 0 - 55 unit/L    Aspartate Aminotransferase 15 5 - 34 unit/L    Estimated GFR- >60 mls/min/1.73/m2   POCT glucose    Collection Time: 06/15/22 10:06 PM   Result Value Ref Range    POCT Glucose 98 70 - 110 mg/dL   COVID-19 Rapid Screening    Collection Time: 06/15/22 10:37 PM   Result Value Ref Range    SARS COV-2 MOLECULAR Negative Negative   POCT glucose    Collection Time: 06/16/22  4:43 AM   Result Value Ref Range    POCT Glucose 93 70 - 110 mg/dL       CT Abdomen Pelvis With Contrast   Final Result      1.  Collection extending from the  pancreatic tail superiorly through the diaphragmatic hiatus significantly larger compared to 2019.  This may communicate with the main pancreatic duct.      2.  Left peritoneal peripherally calcified collection stable going back to 2019.         Electronically signed by: Blair Rodriguez   Date:    06/15/2022   Time:    16:10      CT Chest Abdomen Without Contrast (XPD)   Final Result      Cicatrizing appearance of the pancreas with multiloculated cystic structure extending from the pancreas to the GE junction within overall measurement of approximately 11 by 8 cm.  There is additional peripherally calcified cystic lesion adjacent to the small bowel within the left upper quadrant measuring 7 cm.  Recommend comparison to prior imaging.  Findings believed related to prior pancreatic surgery.  Of note contrast is noted in the stomach.  The contrast is dense within the esophagus.  Findings likely represent partial obstruction at the GE junction.         Electronically signed by: Dmitriy Carnes   Date:    06/14/2022   Time:    18:48              Assessment/Plan:  Dysphagia to solids and liquids   NPO for now   Change PPI to IV  Multiloculated cystic structure extending from the tail of the pancreas to the GE junction, causing lower esophageal, GE Junction and duodenal bulb compression.   Evaluation of images by Dr. Carlson and Dr. Federico Goyal   Consider endoscopic vs surgical intervention      Thank you for allowing us to participate in the care of Tato Schmidt.    Mary Ellen Coon NP acting as a scribe for Eric Carlson MD  Gastroenterology Advanced Endoscopist  [address]

## 2022-06-16 NOTE — PROGRESS NOTES
SURG ONC DR HARLEY RUSSELL REVIEWED FILMS PER REQUEST BY DR BANERJEE  HE WILL SPEAK WITH DR BANERJEE ABOUT HIM PERFORMING POSSIBLE DRAINAGE PROCEDURE DUE TO CYST IN FACE OF PANCREATITIS WITH COMPRESSION     DR RUSSELL OUT OF TOWN UNTIL NEXT WEEK  WILL FOLLOW WHEN RETURNS

## 2022-06-17 ENCOUNTER — ANESTHESIA EVENT (OUTPATIENT)
Dept: SURGERY | Facility: HOSPITAL | Age: 57
DRG: 405 | End: 2022-06-17
Payer: COMMERCIAL

## 2022-06-17 ENCOUNTER — ANESTHESIA (OUTPATIENT)
Dept: SURGERY | Facility: HOSPITAL | Age: 57
DRG: 405 | End: 2022-06-17
Payer: COMMERCIAL

## 2022-06-17 LAB
ESTROGEN SERPL-MCNC: NORMAL PG/ML
INSULIN SERPL-ACNC: NORMAL U[IU]/ML
LAB AP CLINICAL INFORMATION: NORMAL
LAB AP GROSS DESCRIPTION: NORMAL
LAB AP REPORT FOOTNOTES: NORMAL
POCT GLUCOSE: 102 MG/DL (ref 70–110)
POCT GLUCOSE: 85 MG/DL (ref 70–110)
POCT GLUCOSE: 86 MG/DL (ref 70–110)
T3RU NFR SERPL: NORMAL %

## 2022-06-17 PROCEDURE — 37000009 HC ANESTHESIA EA ADD 15 MINS: Performed by: INTERNAL MEDICINE

## 2022-06-17 PROCEDURE — 63600175 PHARM REV CODE 636 W HCPCS: Performed by: INTERNAL MEDICINE

## 2022-06-17 PROCEDURE — 63600175 PHARM REV CODE 636 W HCPCS: Performed by: NURSE ANESTHETIST, CERTIFIED REGISTERED

## 2022-06-17 PROCEDURE — 37000008 HC ANESTHESIA 1ST 15 MINUTES: Performed by: INTERNAL MEDICINE

## 2022-06-17 PROCEDURE — 25000003 PHARM REV CODE 250: Performed by: NURSE ANESTHETIST, CERTIFIED REGISTERED

## 2022-06-17 PROCEDURE — C1753 CATH, INTRAVAS ULTRASOUND: HCPCS | Performed by: INTERNAL MEDICINE

## 2022-06-17 PROCEDURE — 43237 ENDOSCOPIC US EXAM ESOPH: CPT | Performed by: INTERNAL MEDICINE

## 2022-06-17 PROCEDURE — C1726 CATH, BAL DIL, NON-VASCULAR: HCPCS | Performed by: INTERNAL MEDICINE

## 2022-06-17 PROCEDURE — 27201423 OPTIME MED/SURG SUP & DEVICES STERILE SUPPLY: Performed by: INTERNAL MEDICINE

## 2022-06-17 PROCEDURE — C9113 INJ PANTOPRAZOLE SODIUM, VIA: HCPCS | Performed by: NURSE PRACTITIONER

## 2022-06-17 PROCEDURE — 11000001 HC ACUTE MED/SURG PRIVATE ROOM

## 2022-06-17 PROCEDURE — 63600175 PHARM REV CODE 636 W HCPCS: Performed by: NURSE PRACTITIONER

## 2022-06-17 PROCEDURE — 43240 EGD W/TRANSMURAL DRAIN CYST: CPT | Performed by: INTERNAL MEDICINE

## 2022-06-17 PROCEDURE — C1769 GUIDE WIRE: HCPCS | Performed by: INTERNAL MEDICINE

## 2022-06-17 PROCEDURE — 43235 EGD DIAGNOSTIC BRUSH WASH: CPT | Performed by: INTERNAL MEDICINE

## 2022-06-17 RX ORDER — PROPOFOL 10 MG/ML
VIAL (ML) INTRAVENOUS
Status: DISCONTINUED | OUTPATIENT
Start: 2022-06-17 | End: 2022-06-17

## 2022-06-17 RX ORDER — SODIUM CHLORIDE, SODIUM LACTATE, POTASSIUM CHLORIDE, CALCIUM CHLORIDE 600; 310; 30; 20 MG/100ML; MG/100ML; MG/100ML; MG/100ML
INJECTION, SOLUTION INTRAVENOUS CONTINUOUS PRN
Status: DISCONTINUED | OUTPATIENT
Start: 2022-06-17 | End: 2022-06-17

## 2022-06-17 RX ORDER — ACETAMINOPHEN 500 MG
500 TABLET ORAL 3 TIMES DAILY PRN
Status: DISCONTINUED | OUTPATIENT
Start: 2022-06-17 | End: 2022-06-22 | Stop reason: HOSPADM

## 2022-06-17 RX ORDER — ONDANSETRON 2 MG/ML
INJECTION INTRAMUSCULAR; INTRAVENOUS
Status: DISCONTINUED | OUTPATIENT
Start: 2022-06-17 | End: 2022-06-17

## 2022-06-17 RX ORDER — ONDANSETRON 2 MG/ML
4 INJECTION INTRAMUSCULAR; INTRAVENOUS DAILY PRN
Status: CANCELLED | OUTPATIENT
Start: 2022-06-17

## 2022-06-17 RX ORDER — LIDOCAINE HYDROCHLORIDE 10 MG/ML
INJECTION, SOLUTION EPIDURAL; INFILTRATION; INTRACAUDAL; PERINEURAL
Status: DISCONTINUED | OUTPATIENT
Start: 2022-06-17 | End: 2022-06-17

## 2022-06-17 RX ORDER — LEVOFLOXACIN 5 MG/ML
500 INJECTION, SOLUTION INTRAVENOUS
Status: DISCONTINUED | OUTPATIENT
Start: 2022-06-18 | End: 2022-06-22 | Stop reason: HOSPADM

## 2022-06-17 RX ORDER — FENTANYL CITRATE 50 UG/ML
INJECTION, SOLUTION INTRAMUSCULAR; INTRAVENOUS
Status: DISCONTINUED | OUTPATIENT
Start: 2022-06-17 | End: 2022-06-17

## 2022-06-17 RX ORDER — SUCCINYLCHOLINE CHLORIDE 20 MG/ML
INJECTION INTRAMUSCULAR; INTRAVENOUS
Status: DISCONTINUED | OUTPATIENT
Start: 2022-06-17 | End: 2022-06-17

## 2022-06-17 RX ORDER — ROCURONIUM BROMIDE 10 MG/ML
INJECTION, SOLUTION INTRAVENOUS
Status: DISCONTINUED | OUTPATIENT
Start: 2022-06-17 | End: 2022-06-17

## 2022-06-17 RX ORDER — MIDAZOLAM HYDROCHLORIDE 1 MG/ML
INJECTION INTRAMUSCULAR; INTRAVENOUS
Status: DISCONTINUED | OUTPATIENT
Start: 2022-06-17 | End: 2022-06-17

## 2022-06-17 RX ADMIN — FENTANYL CITRATE 50 MCG: 50 INJECTION, SOLUTION INTRAMUSCULAR; INTRAVENOUS at 01:06

## 2022-06-17 RX ADMIN — MIDAZOLAM HYDROCHLORIDE 2 MG: 1 INJECTION INTRAMUSCULAR; INTRAVENOUS at 01:06

## 2022-06-17 RX ADMIN — SODIUM CHLORIDE, POTASSIUM CHLORIDE, SODIUM LACTATE AND CALCIUM CHLORIDE: 600; 310; 30; 20 INJECTION, SOLUTION INTRAVENOUS at 01:06

## 2022-06-17 RX ADMIN — ROCURONIUM BROMIDE 20 MG: 10 SOLUTION INTRAVENOUS at 02:06

## 2022-06-17 RX ADMIN — FENTANYL CITRATE 50 MCG: 50 INJECTION, SOLUTION INTRAMUSCULAR; INTRAVENOUS at 02:06

## 2022-06-17 RX ADMIN — ROCURONIUM BROMIDE 5 MG: 10 SOLUTION INTRAVENOUS at 01:06

## 2022-06-17 RX ADMIN — ONDANSETRON 4 MG: 2 INJECTION INTRAMUSCULAR; INTRAVENOUS at 02:06

## 2022-06-17 RX ADMIN — PANTOPRAZOLE SODIUM 40 MG: 40 INJECTION, POWDER, FOR SOLUTION INTRAVENOUS at 09:06

## 2022-06-17 RX ADMIN — ROCURONIUM BROMIDE 25 MG: 10 SOLUTION INTRAVENOUS at 01:06

## 2022-06-17 RX ADMIN — PROPOFOL 200 MG: 10 INJECTION, EMULSION INTRAVENOUS at 01:06

## 2022-06-17 RX ADMIN — SUCCINYLCHOLINE CHLORIDE 140 MG: 20 INJECTION, SOLUTION INTRAMUSCULAR; INTRAVENOUS at 01:06

## 2022-06-17 RX ADMIN — LIDOCAINE HYDROCHLORIDE 5 ML: 10 INJECTION, SOLUTION EPIDURAL; INFILTRATION; INTRACAUDAL; PERINEURAL at 01:06

## 2022-06-17 RX ADMIN — SUGAMMADEX 200 MG: 100 INJECTION, SOLUTION INTRAVENOUS at 02:06

## 2022-06-17 RX ADMIN — SODIUM CHLORIDE, POTASSIUM CHLORIDE, SODIUM LACTATE AND CALCIUM CHLORIDE: 600; 310; 30; 20 INJECTION, SOLUTION INTRAVENOUS at 03:06

## 2022-06-17 NOTE — ANESTHESIA PREPROCEDURE EVALUATION
06/17/2022  Tato Schmidt is a 57 y.o., male w/ dysphagia, extrinsic esophageal compression   @ the GE Jxn who presents for an EUS/FNA.     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  Also has a history of a pancreatic pseudocyst near the distal esophagus as well as other cysts.  Patient had an EGD earlier revealing extrinsic compression of the esophagus likely due to the aforementioned pseudocyst.     CT of Abdomen:  Multiloculated cystic structure extending from the tail of the pancreas to the GE junction, causing lower esophageal, GE Junction and duodenal bulb compression.  Pre-op Assessment    I have reviewed the Patient Summary Reports.     I have reviewed the Nursing Notes. I have reviewed the NPO Status.   I have reviewed the Medications.     Review of Systems  Anesthesia Hx:  No problems with previous Anesthesia    Cardiovascular:  Cardiovascular Normal     Pulmonary:  Pulmonary Normal    Hepatic/GI:   H/O Biliary Pancreatitis, w/ Pancreatic Pseudocysts   Endocrine:   Diabetes      LAB:  CBC:  Lab Results   Component Value Date    WBC 4.6 06/15/2022    RBC 4.84 06/15/2022    HGB 13.0 (L) 06/15/2022    HCT 40.4 (L) 06/15/2022      Latest Reference Range & Units 11/19/19 04:59 06/14/22 09:32 06/15/22 03:56   Sodium 136 - 145 mmol/L 135 (L) 138 142   Potassium 3.5 - 5.1 mmol/L 5.4 (H) 4.2 4.3   Chloride 98 - 107 mmol/L 106 106 108 (H)   CO2 22 - 29 mmol/L 25.0 19 (L) 21 (L)   BUN 8.4 - 25.7 mg/dL 9.0 15.9 13.4   Creatinine 0.73 - 1.18 mg/dL 1.05 0.86 0.79   eGFR if non African American mL/min/1.73 m2 >60     eGFR if African American mls/min/1.73/m2 >60 >60 >60   Glucose 74 - 100 mg/dL 187 (H) 118 (H) 97   Calcium 8.4 - 10.2 mg/dL 8.8 9.3 9.4    Alkaline Phosphatase 40 - 150 unit/L 91 62 54   PROTEIN TOTAL 6.4 - 8.3 gm/dL 7.9 8.6 (H) 8.2   Albumin 3.5 - 5.0 gm/dL 3.50 3.8 3.3 (L)   Albumin/Globulin Ratio 1.1 - 2.0 ratio 0.8 (L) 0.8 (L) 0.7 (L)   BILIRUBIN TOTAL <=1.5 mg/dL 0.5 1.2 0.9   Bilirubin, Direct 0.00 - 0.50 mg/dL 0.10     Bilirubin, Indirect 0.00 - 0.80 mg/dL 0.40     AST 5 - 34 unit/L 53 (H) 18 15   ALT 0 - 55 unit/L 45 7 7   Amylase 25 - 115 unit/L 149 (H)     Lipase 73 - 393 unit/L 580 (H)     Globulin, Total 2.4 - 3.5 gm/dL 4.40 (H) 4.8 (H) 4.9 (H)   (L): Data is abnormally low  (H): Data is abnormally high    Physical Exam  General: Alert and Oriented    Airway:  Mallampati: II   Mouth Opening: Normal  TM Distance: Normal  Tongue: Normal  Neck ROM: Normal ROM    Dental:  Intact    Chest/Lungs:  Normal Respiratory Rate    Heart:  Rate: Normal  Rhythm: Regular Rhythm        Anesthesia Plan  Type of Anesthesia, risks & benefits discussed:    Anesthesia Type: Gen ETT  Intra-op Monitoring Plan: Standard ASA Monitors  Post Op Pain Control Plan: IV/PO Opioids PRN  Induction:  IV  Airway Plan: Direct  Informed Consent: Informed consent signed with the Patient and all parties understand the risks and agree with anesthesia plan.  All questions answered. Patient consented to blood products? No  ASA Score: 3  Day of Surgery Review of History & Physical: H&P Update referred to the surgeon/provider.  Anesthesia Plan Notes: Pt. at increase risk for aspiration, per Dr. Carlson, therefore will plan GETA for EUS procedure    Ready For Surgery From Anesthesia Perspective.     .

## 2022-06-17 NOTE — PROGRESS NOTES
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 Trinh with Gastroenterology who presented to [facility] on 6/14/2022 with complaints of persistent dysphagia with an inability to tolerate oral intake and generalized weakness over the past 6 days. He states that he feels the food/liquids getting stuck in his throat when he eats which causes him to regurgitate.  He denied any nausea, vomiting, or abdominal pain and is able to tolerate his secretions.  GI was consulted while he was in the ED who took him for an EGD on 06/14/2022 for a possible food bolus, but during the procedure he was noted to have an esophageal stricture which was dilated.  The information about the procedure was obtained from the ED physician. Hospital Medicine was consulted for admission given the patient's inability to tolerate oral intake and GI will follow along.     Of note, he was seen in the ED on 05/08/2022 with similar symptoms but was able to tolerate oral intake after receiving IV glucagon and famotidine while in the ED so he was discharged home.     His vital signs are currently stable.  His lab showed a bicarbonate of 19, otherwise they were mostly unremarkable.      Interval Hx:   6/15/22-Patient is boarding in the emergency room .  Currently sleeping soundly .  No acute issues reported.  He remains NPO.  The patient had an EGD yesterday revealing extensive compression of the esophagus.  Later that evening he had a CT of the chest abdomen without contrast revealing a pancreas with a multiloculated 6 cystic structure extending from  the pancreas to the GE junction with overall measurement of approximately 11 cm x 8 cm.  Findings likely represent partial obstruction at the GE junction.    6/16/22 plans for EUS drainage if cystic peripancreatic fluid collection causing extrinsic compression of distal esophagus/GE junction/stomach causing dysphagia.    Objective/physical exam:  General: in no acute distress  HENT: normocephalic, atraumatic  Eye: PERRL, EOMI, clear conjunctiva  Neck: full ROM, no thyromegaly, no JVD  Respiratory: clear to auscultation bilaterally  Cardiovascular: regular rate and rhythm, no murmur  Gastrointestinal: non-distended, positive bowel sounds, non-tender  Musculoskeletal: no gross deformity  Integumentary: warm, dry, intact, no rashes  Neurological: cranial nerves grossly intact, no focal neurological deficit  Psychiatric: cooperative, flat affect, depressed      VITAL SIGNS: 24 HRS MIN & MAX LAST   Temp  Min: 97.8 °F (36.6 °C)  Max: 98.3 °F (36.8 °C) 98.3 °F (36.8 °C)   BP  Min: 108/67  Max: 128/85 120/77   Pulse  Min: 67  Max: 80  77   Resp  Min: 16  Max: 19 16   SpO2  Min: 96 %  Max: 100 % 99 %       Recent Labs   Lab 06/14/22  0932 06/15/22  0356   WBC 5.2 4.6   RBC 5.41 4.84   HGB 14.4 13.0*   HCT 44.4 40.4*   MCV 82.1 83.5   MCH 26.6* 26.9*   MCHC 32.4* 32.2*   RDW 13.5 13.3    158   MPV 12.3 11.7       Recent Labs   Lab 06/14/22  0932 06/15/22  0356    142   K 4.2 4.3   CO2 19* 21*   BUN 15.9 13.4   CREATININE 0.86 0.79   CALCIUM 9.3 9.4   ALBUMIN 3.8 3.3*   ALKPHOS 62 54   ALT 7 7   AST 18 15   BILITOT 1.2 0.9          Microbiology Results (last 7 days)     ** No results found for the last 168 hours. **           See below for Radiology    Scheduled Med:   pantoprazole  40 mg Intravenous Daily        Continuous Infusions:   lactated ringers 125 mL/hr at 06/16/22 0918        PRN Meds:  dextrose 50%, dextrose 50%, glucagon (human recombinant), insulin aspart U-100, ondansetron        Assessment/Plan:  Esophageal dysphagia due to extrinsic compression due to large   Multiloculated cystic structure extending from the tail of the pancreas to the GE    DM2- A1C 11.8    Patient condition:  Stable    Anticipated discharge and Disposition:  Discharge home once tolerating oral intake and cleared by Gastroenterology    All diagnosis and differential diagnosis have been reviewed; assessment and plan has been documented; I have personally reviewed the labs and test results that are presently available; I have reviewed the patients medication list; I have reviewed the consulting providers response and recommendations. I have reviewed or attempted to review medical records based upon their availability    All of the patient's questions have been  addressed and answered. Patient's is agreeable to the above stated plan. I will continue to monitor closely and make adjustments to medical management as needed.  _____________________________________________________________________    Nutrition Status:    Radiology:  CT Abdomen Pelvis With Contrast  Narrative: EXAMINATION:  CT ABDOMEN PELVIS WITH CONTRAST    CLINICAL HISTORY:  Abdominal abscess/infection suspected;Pancreatic mass/pseudocyst;    TECHNIQUE:  Helical acquisition through the abdomen and pelvis with IV and enteric contrast.  Three plane reconstructions were provided for review.  mGycm. Automatic exposure control, adjustment of mA/kV or iterative reconstruction technique was used to reduce radiation.    COMPARISON:  14 June 2022 19 November 2019    FINDINGS:  There is a small left pleural effusion.  There is left basilar atelectasis.    There is organized collection extending superiorly from the pancreatic tail through the diaphragmatic hiatus around the distal esophagus.  This measures approximately 10 x 8 x 6 cm.  The largest component is medial to the gastric fundus.  Collection appears to be septated superiorly.  It is possible this  communicates with the main pancreatic duct.  Collection present going back to 2019 but is significantly larger.  The pancreatic body is irregular and atrophic.  Main pancreatic duct is dilated in the tail.  Similar irregularity of the pancreatic fat compared to 2019 likely sequela of chronic inflammation.    Minimal intrahepatic biliary ductal dilatation in the left lobe.  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  The gallbladder is surgically absent.  No significant abnormality of the spleen, adrenals or kidneys.    There is no bowel obstruction.  No significant inflammatory changes of the bowel.  There is a stable peripherally calcified peritoneal collection left abdomen image 38 series 8 measuring up to almost 7 cm.    The urinary bladder is unremarkable.  No pelvic free fluid.  The abdominal aorta is normal in caliber.  No retroperitoneal adenopathy.    Mild degenerative changes of the spine.  No acute osseous findings.  Impression: 1.  Collection extending from the pancreatic tail superiorly through the diaphragmatic hiatus significantly larger compared to 2019.  This may communicate with the main pancreatic duct.    2.  Left peritoneal peripherally calcified collection stable going back to 2019.    Electronically signed by: Blair Rodriguez  Date:    06/15/2022  Time:    16:10      John Roger MD   06/16/2022

## 2022-06-17 NOTE — PROGRESS NOTES
PT NPO for EUS today with Dr. Carlson  No acute changes overnight  VSS    Further recs post procedure

## 2022-06-17 NOTE — ANESTHESIA PROCEDURE NOTES
Intubation    Date/Time: 6/17/2022 1:28 PM  Performed by: Andriy Leiva CRNA  Authorized by: Merritt Murcia MD     Intubation:     Induction:  Intravenous    Intubated:  Postinduction    Mask Ventilation:  Easy mask    Attempts:  1    Attempted By:  CRNA    Method of Intubation:  Direct    Laryngeal View Grade: Grade IIb - only the arytenoids and epiglottis seen      Difficult Airway Encountered?: No      Complications:  None    Airway Device:  Direct and oral endotracheal tube    Airway Device Size:  7.5    Style/Cuff Inflation:  Cuffed (inflated to minimal occlusive pressure)    Inflation Amount (mL):  6    Tube secured:  22    Secured at:  The lips    Placement Verified By:  Auscultation and Capnometry    Complicating Factors:  Anterior larynx, overbite, small mouth and poor neck/head extension    Findings Post-Intubation:  Bilateral breath sounds, positive ETCO2 and atraumatic / condition of teeth unchanged

## 2022-06-17 NOTE — PLAN OF CARE
06/17/22 1644   Discharge Assessment   Assessment Type Discharge Planning Assessment   Confirmed/corrected address, phone number and insurance Yes   Confirmed Demographics Correct on Facesheet   Source of Information patient;family   Communicated MARLIN with patient/caregiver Date not available/Unable to determine   Reason For Admission pancreatic cyst   Lives With spouse   Facility Arrived From: home   Do you expect to return to your current living situation? Yes   Do you have help at home or someone to help you manage your care at home? Yes   Walking or Climbing Stairs Difficulty none   Dressing/Bathing Difficulty none   Home Accessibility wheelchair accessible   Equipment Currently Used at Home none   Readmission within 30 days? No   Patient currently being followed by outpatient case management? No   Do you currently have service(s) that help you manage your care at home? No   Do you take prescription medications? Yes   Do you have prescription coverage? Yes   Do you have any problems affording any of your prescribed medications? No   Who is going to help you get home at discharge? spouse   How do you get to doctors appointments? car, drives self   Discharge Plan A Home with family   DME Needed Upon Discharge  none   Relationship/Environment   Name(s) of Who Lives With Patient significant other  lauren   Significant other, Lauren is present in the room.  She is the support, help for spouse  pcp is [facility] in [place].   Uses community pharmacy in [place]

## 2022-06-17 NOTE — PROVATION PATIENT INSTRUCTIONS
Discharge Summary/Instructions after an Endoscopic Procedure  Patient Name: Tato Schmidt  Patient MRN: [id number]  Patient YOB: 1965 Friday, June 17, 2022  Eric Carlson MD  Dear patient,  As a result of recent federal legislation (The Federal Cures Act), you may   receive lab or pathology results from your procedure in your [facility]   account before your physician is able to contact you. Your physician or   their representative will relay the results to you with their   recommendations at their soonest availability.  Thank you,  RESTRICTIONS:  During your procedure today, you received medications for sedation.  These   medications may affect your judgment, balance and coordination.  Therefore,   for 24 hours, you have the following restrictions:   - DO NOT drive a car, operate machinery, make legal/financial decisions,   sign important papers or drink alcohol.    ACTIVITY:  Today: no heavy lifting, straining or running due to procedural   sedation/anesthesia.  The following day: return to full activity including work.  DIET:  Eat and drink normally unless instructed otherwise.     TREATMENT FOR COMMON SIDE EFFECTS:  - Mild abdominal pain, nausea, belching, bloating or excessive gas:  rest,   eat lightly and use a heating pad.  - Sore Throat: treat with throat lozenges and/or gargle with warm salt   water.  - Because air was used during the procedure, expelling large amounts of air   from your rectum or belching is normal.  - If a bowel prep was taken, you may not have a bowel movement for 1-3 days.    This is normal.  SYMPTOMS TO WATCH FOR AND REPORT TO YOUR PHYSICIAN:  1. Abdominal pain or bloating, other than gas cramps.  2. Chest pain.  3. Back pain.  4. Signs of infection such as: chills or fever occurring within 24 hours   after the procedure.  5. Rectal bleeding, which would show as bright red, maroon, or black stools.   (A tablespoon of blood from the rectum is not serious, especially if    hemorrhoids are present.)  6. Vomiting.  7. Weakness or dizziness.  GO DIRECTLY TO THE NEAREST EMERGENCY ROOM IF YOU HAVE ANY OF THE FOLLOWING:      Difficulty breathing              Chills and/or fever over 101 F   Persistent vomiting and/or vomiting blood   Severe abdominal pain   Severe chest pain   Black, tarry stools   Bleeding- more than one tablespoon   Any other symptom or condition that you feel may need urgent attention  Your doctor recommends these additional instructions:  If any biopsies were taken, your doctors clinic will contact you in 1 to 2   weeks with any results.  - Return patient to hospital fam for ongoing care.   - Clear liquid diet for 3 days.   - Continue present medications.   - Avoid PPI & H2 blockers  - 4-6 oz of carbonated beverages every 4 hrs while awake.  - Levaquin (levofloxacin) 500 mg IV daily until discharge.   - Perform an upper GI endoscopy with fluro on Monday to reassess cavity.   - The findings and recommendations were discussed with the patient's   family.  For questions, problems or results please call your physician - Eric Carlson MD at Work:  [phone].  [facility], EMERGENCY ROOM PHONE NUMBER: [phone]  IF A COMPLICATION OR EMERGENCY SITUATION ARISES AND YOU ARE UNABLE TO REACH   YOUR PHYSICIAN - GO DIRECTLY TO THE EMERGENCY ROOM.  Eric Carlson MD  6/17/2022 3:01:32 PM  This report has been verified and signed electronically.  Dear patient,  As a result of recent federal legislation (The Federal Cures Act), you may   receive lab or pathology results from your procedure in your [facility]   account before your physician is able to contact you. Your physician or   their representative will relay the results to you with their   recommendations at their soonest availability.  Thank you,  PROVATION

## 2022-06-17 NOTE — TRANSFER OF CARE
"Anesthesia Transfer of Care Note    Patient: Tato Schmidt    Procedure(s) Performed: Procedure(s) (LRB):  Upper EUS w/poss FNA. (N/A)  EGD (ESOPHAGOGASTRODUODENOSCOPY) (N/A)  EGD, WITH STENT INSERTION  EGD, WITH BALLOON DILATION    Patient location: PACU    Anesthesia Type: general    Transport from OR: Transported from OR on room air with adequate spontaneous ventilation    Post pain: adequate analgesia    Post assessment: no apparent anesthetic complications    Post vital signs: stable    Level of consciousness: awake    Nausea/Vomiting: no nausea/vomiting    Complications: none    Transfer of care protocol was followed      Last vitals:   Visit Vitals  /77   Pulse 71   Temp 37 °C (98.6 °F)   Resp 16   Ht (P) 5' 10" (1.778 m)   Wt (P) 90.1 kg (198 lb 10.2 oz)   SpO2 99%   BMI (P) 28.50 kg/m²     "

## 2022-06-18 LAB
ALBUMIN SERPL-MCNC: 3.3 GM/DL (ref 3.5–5)
ALBUMIN/GLOB SERPL: 0.9 RATIO (ref 1.1–2)
ALP SERPL-CCNC: 50 UNIT/L (ref 40–150)
ALT SERPL-CCNC: <5 UNIT/L (ref 0–55)
AST SERPL-CCNC: 13 UNIT/L (ref 5–34)
BILIRUBIN DIRECT+TOT PNL SERPL-MCNC: 1.2 MG/DL
BUN SERPL-MCNC: 9.1 MG/DL (ref 8.4–25.7)
CALCIUM SERPL-MCNC: 9.1 MG/DL (ref 8.4–10.2)
CHLORIDE SERPL-SCNC: 104 MMOL/L (ref 98–107)
CO2 SERPL-SCNC: 26 MMOL/L (ref 22–29)
CREAT SERPL-MCNC: 0.71 MG/DL (ref 0.73–1.18)
GLOBULIN SER-MCNC: 3.7 GM/DL (ref 2.4–3.5)
GLUCOSE SERPL-MCNC: 134 MG/DL (ref 74–100)
POCT GLUCOSE: 103 MG/DL (ref 70–110)
POCT GLUCOSE: 116 MG/DL (ref 70–110)
POCT GLUCOSE: 157 MG/DL (ref 70–110)
POCT GLUCOSE: 165 MG/DL (ref 70–110)
POTASSIUM SERPL-SCNC: 3.7 MMOL/L (ref 3.5–5.1)
PROT SERPL-MCNC: 7 GM/DL (ref 6.4–8.3)
SODIUM SERPL-SCNC: 140 MMOL/L (ref 136–145)

## 2022-06-18 PROCEDURE — 80053 COMPREHEN METABOLIC PANEL: CPT | Performed by: NURSE PRACTITIONER

## 2022-06-18 PROCEDURE — 36415 COLL VENOUS BLD VENIPUNCTURE: CPT | Performed by: NURSE PRACTITIONER

## 2022-06-18 PROCEDURE — 63600175 PHARM REV CODE 636 W HCPCS: Performed by: NURSE PRACTITIONER

## 2022-06-18 PROCEDURE — 11000001 HC ACUTE MED/SURG PRIVATE ROOM

## 2022-06-18 RX ADMIN — LEVOFLOXACIN 500 MG: 500 INJECTION, SOLUTION INTRAVENOUS at 09:06

## 2022-06-18 NOTE — PROGRESS NOTES
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 Trinh with Gastroenterology who presented to [facility] on 6/14/2022 with complaints of persistent dysphagia with an inability to tolerate oral intake and generalized weakness over the past 6 days. He states that he feels the food/liquids getting stuck in his throat when he eats which causes him to regurgitate.  He denied any nausea, vomiting, or abdominal pain and is able to tolerate his secretions.  GI was consulted while he was in the ED who took him for an EGD on 06/14/2022 for a possible food bolus, but during the procedure he was noted to have an esophageal stricture which was dilated.  The information about the procedure was obtained from the ED physician. Hospital Medicine was consulted for admission given the patient's inability to tolerate oral intake and GI will follow along.     Of note, he was seen in the ED on 05/08/2022 with similar symptoms but was able to tolerate oral intake after receiving IV glucagon and famotidine while in the ED so he was discharged home.     His vital signs are currently stable.  His lab showed a bicarbonate of 19, otherwise they were mostly unremarkable.      Interval Hx:   6/15/22-Patient is boarding in the emergency room .  Currently sleeping soundly .  No acute issues reported.  He remains NPO.  The patient had an EGD yesterday revealing extensive compression of the esophagus.  Later that evening he had a CT of the chest abdomen without contrast revealing a pancreas with a multiloculated 6 cystic structure extending from  the pancreas to the GE junction with overall measurement of approximately 11 cm x 8 cm.  Findings likely represent partial obstruction at the GE junction.    6/16/22 plans for EUS drainage if cystic peripancreatic fluid collection causing extrinsic compression of distal esophagus/GE junction/stomach causing dysphagia.    6/17/22 no complains . On his way to EUS for drainage of  Cystic pancreatic lesion     6/18/22 no complains. Feeling better    Objective/physical exam:  General: in no acute distress  HENT: normocephalic, atraumatic  Eye: PERRL, EOMI, clear conjunctiva  Neck: full ROM, no thyromegaly, no JVD  Respiratory: clear to auscultation bilaterally  Cardiovascular: regular rate and rhythm, no murmur  Gastrointestinal: non-distended, positive bowel sounds, non-tender  Musculoskeletal: no gross deformity  Integumentary: warm, dry, intact, no rashes  Neurological: cranial nerves grossly intact, no focal neurological deficit  Psychiatric: cooperative, flat affect, depressed      VITAL SIGNS: 24 HRS MIN & MAX LAST   Temp  Min: 97.6 °F (36.4 °C)  Max: 98.6 °F (37 °C) 98.5 °F (36.9 °C)   BP  Min: 109/64  Max: 138/86 118/77   Pulse  Min: 71  Max: 86  86   Resp  Min: 12  Max: 20 20   SpO2  Min: 95 %  Max: 100 % 97 %       Recent Labs   Lab 06/14/22  0932 06/15/22  0356   WBC 5.2 4.6   RBC 5.41 4.84   HGB 14.4 13.0*   HCT 44.4 40.4*   MCV 82.1 83.5   MCH 26.6* 26.9*   MCHC 32.4* 32.2*   RDW 13.5 13.3    158   MPV 12.3 11.7       Recent Labs   Lab 06/14/22  0932 06/15/22  0356 06/18/22  0808    142 140   K 4.2 4.3 3.7   CO2 19* 21* 26   BUN 15.9 13.4 9.1   CREATININE 0.86 0.79 0.71*   CALCIUM 9.3 9.4 9.1   ALBUMIN 3.8 3.3* 3.3*   ALKPHOS 62 54 50   ALT 7 7 <5   AST 18 15 13   BILITOT 1.2 0.9 1.2          Microbiology Results (last 7 days)     ** No results found for the last 168 hours. **           See below for Radiology    Scheduled Med:   levoFLOXacin  500 mg Intravenous Q24H        Continuous  Infusions:   lactated ringers 125 mL/hr at 06/17/22 0336        PRN Meds:  acetaminophen, dextrose 50%, dextrose 50%, glucagon (human recombinant), insulin aspart U-100, ondansetron       Assessment/Plan:  Esophageal dysphagia due to extrinsic compression due to large   Multiloculated cystic structure extending from the tail of the pancreas to the GE  -EUS post axial stent  -noppi/h2  -allow carbonated drinks  -egd w fluro monday    DM2- A1C 11.8      Plan- po    Patient condition:  Stable    Anticipated discharge and Disposition:  Discharge home once tolerating oral intake and cleared by Gastroenterology    All diagnosis and differential diagnosis have been reviewed; assessment and plan has been documented; I have personally reviewed the labs and test results that are presently available; I have reviewed the patients medication list; I have reviewed the consulting providers response and recommendations. I have reviewed or attempted to review medical records based upon their availability    All of the patient's questions have been  addressed and answered. Patient's is agreeable to the above stated plan. I will continue to monitor closely and make adjustments to medical management as needed.  _____________________________________________________________________    Nutrition Status:    Radiology:  FL Flouro Usage  Narrative: EXAMINATION:  FL FLOURO USAGE    CLINICAL HISTORY:  axios stent placement;    FINDINGS:  Fluoroscopic assistance provided during cyst gastrostomy.  Images were independently interpreted by the attending physician in the operating room.    Fluoro time 106 seconds.    Radiation dose 40.96 mGy.  Impression: Fluoroscopic assistance provided as above.    Electronically signed by: Blair Rodriguez  Date:    06/17/2022  Time:    15:15      John Roger MD   06/18/2022

## 2022-06-18 NOTE — PROGRESS NOTES
"Gastroenterology Progress Note    Subjective/Interval History:  Patient tolerating liquids today better than yesterday.    ROS:    Review of Systems   All other systems reviewed and are negative.        Vital Signs:  /68   Pulse 82   Temp 97.8 °F (36.6 °C) (Oral)   Resp 18   Ht (P) 5' 10" (1.778 m)   Wt (P) 90.1 kg (198 lb 10.2 oz)   SpO2 98%   BMI (P) 28.50 kg/m²   Body mass index is 28.5 kg/m² (pended).    Physical Exam:    Physical Exam  Constitutional:       General: He is not in acute distress.     Appearance: He is not ill-appearing.   HENT:      Head: Normocephalic and atraumatic.   Eyes:      General: No scleral icterus.     Extraocular Movements: Extraocular movements intact.   Cardiovascular:      Rate and Rhythm: Normal rate and regular rhythm.   Pulmonary:      Effort: Pulmonary effort is normal. No respiratory distress.   Abdominal:      General: Bowel sounds are normal. There is no distension.      Palpations: Abdomen is soft. There is no mass.      Tenderness: There is no abdominal tenderness. There is no guarding or rebound.   Musculoskeletal:      Right lower leg: No edema.      Left lower leg: No edema.   Skin:     General: Skin is warm and dry.      Coloration: Skin is not jaundiced.   Neurological:      Mental Status: He is alert and oriented to person, place, and time.   Psychiatric:         Mood and Affect: Mood normal.         Behavior: Behavior normal.         Labs:  Recent Results (from the past 48 hour(s))   POCT glucose    Collection Time: 06/16/22  5:35 PM   Result Value Ref Range    POCT Glucose 102 70 - 110 mg/dL   POCT glucose    Collection Time: 06/16/22 10:19 PM   Result Value Ref Range    POCT Glucose 97 70 - 110 mg/dL   POCT glucose    Collection Time: 06/17/22  3:56 AM   Result Value Ref Range    POCT Glucose 86 70 - 110 mg/dL   POCT glucose    Collection Time: 06/17/22 11:03 AM   Result Value Ref Range    POCT Glucose 85 70 - 110 mg/dL   POCT glucose    Collection " Time: 06/17/22  4:57 PM   Result Value Ref Range    POCT Glucose 102 70 - 110 mg/dL   POCT glucose    Collection Time: 06/17/22 11:12 PM   Result Value Ref Range    POCT Glucose 103 70 - 110 mg/dL   POCT glucose    Collection Time: 06/18/22  5:42 AM   Result Value Ref Range    POCT Glucose 116 (H) 70 - 110 mg/dL   Comprehensive Metabolic Panel    Collection Time: 06/18/22  8:08 AM   Result Value Ref Range    Sodium Level 140 136 - 145 mmol/L    Potassium Level 3.7 3.5 - 5.1 mmol/L    Chloride 104 98 - 107 mmol/L    Carbon Dioxide 26 22 - 29 mmol/L    Glucose Level 134 (H) 74 - 100 mg/dL    Blood Urea Nitrogen 9.1 8.4 - 25.7 mg/dL    Creatinine 0.71 (L) 0.73 - 1.18 mg/dL    Calcium Level Total 9.1 8.4 - 10.2 mg/dL    Protein Total 7.0 6.4 - 8.3 gm/dL    Albumin Level 3.3 (L) 3.5 - 5.0 gm/dL    Globulin 3.7 (H) 2.4 - 3.5 gm/dL    Albumin/Globulin Ratio 0.9 (L) 1.1 - 2.0 ratio    Bilirubin Total 1.2 <=1.5 mg/dL    Alkaline Phosphatase 50 40 - 150 unit/L    Alanine Aminotransferase <5 0 - 55 unit/L    Aspartate Aminotransferase 13 5 - 34 unit/L    Estimated GFR- >60 mls/min/1.73/m2         Assessment/Plan:  Okay to continue on a liquid diet.  Dr. Roger plans to repeat EGD on Monday.  Please call if any questions from GI will check back on Monday.       Franc Pal

## 2022-06-18 NOTE — PROGRESS NOTES
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 Trinh with Gastroenterology who presented to [facility] on 6/14/2022 with complaints of persistent dysphagia with an inability to tolerate oral intake and generalized weakness over the past 6 days. He states that he feels the food/liquids getting stuck in his throat when he eats which causes him to regurgitate.  He denied any nausea, vomiting, or abdominal pain and is able to tolerate his secretions.  GI was consulted while he was in the ED who took him for an EGD on 06/14/2022 for a possible food bolus, but during the procedure he was noted to have an esophageal stricture which was dilated.  The information about the procedure was obtained from the ED physician. Hospital Medicine was consulted for admission given the patient's inability to tolerate oral intake and GI will follow along.     Of note, he was seen in the ED on 05/08/2022 with similar symptoms but was able to tolerate oral intake after receiving IV glucagon and famotidine while in the ED so he was discharged home.     His vital signs are currently stable.  His lab showed a bicarbonate of 19, otherwise they were mostly unremarkable.      Interval Hx:   6/15/22-Patient is boarding in the emergency room .  Currently sleeping soundly .  No acute issues reported.  He remains NPO.  The patient had an EGD yesterday revealing extensive compression of the esophagus.  Later that evening he had a CT of the chest abdomen without contrast revealing a pancreas with a multiloculated 6 cystic structure extending from  the pancreas to the GE junction with overall measurement of approximately 11 cm x 8 cm.  Findings likely represent partial obstruction at the GE junction.    6/16/22 plans for EUS drainage if cystic peripancreatic fluid collection causing extrinsic compression of distal esophagus/GE junction/stomach causing dysphagia.    6/17/22 no complains . On his way to EUS for drainage of  Cystic pancreatic lesion     Objective/physical exam:  General: in no acute distress  HENT: normocephalic, atraumatic  Eye: PERRL, EOMI, clear conjunctiva  Neck: full ROM, no thyromegaly, no JVD  Respiratory: clear to auscultation bilaterally  Cardiovascular: regular rate and rhythm, no murmur  Gastrointestinal: non-distended, positive bowel sounds, non-tender  Musculoskeletal: no gross deformity  Integumentary: warm, dry, intact, no rashes  Neurological: cranial nerves grossly intact, no focal neurological deficit  Psychiatric: cooperative, flat affect, depressed      VITAL SIGNS: 24 HRS MIN & MAX LAST   Temp  Min: 97.3 °F (36.3 °C)  Max: 98.6 °F (37 °C) 97.6 °F (36.4 °C)   BP  Min: 117/48  Max: 138/86 125/78   Pulse  Min: 71  Max: 87  78   Resp  Min: 12  Max: 20 16   SpO2  Min: 95 %  Max: 100 % 95 %       Recent Labs   Lab 06/14/22  0932 06/15/22  0356   WBC 5.2 4.6   RBC 5.41 4.84   HGB 14.4 13.0*   HCT 44.4 40.4*   MCV 82.1 83.5   MCH 26.6* 26.9*   MCHC 32.4* 32.2*   RDW 13.5 13.3    158   MPV 12.3 11.7       Recent Labs   Lab 06/14/22  0932 06/15/22  0356    142   K 4.2 4.3   CO2 19* 21*   BUN 15.9 13.4   CREATININE 0.86 0.79   CALCIUM 9.3 9.4   ALBUMIN 3.8 3.3*   ALKPHOS 62 54   ALT 7 7   AST 18 15   BILITOT 1.2 0.9          Microbiology Results (last 7 days)     ** No results found for the last 168 hours. **           See below for Radiology    Scheduled Med:   [START ON 6/18/2022] levoFLOXacin  500 mg Intravenous Q24H        Continuous Infusions:   lactated ringers 125 mL/hr at 06/17/22 0336        PRN  Meds:  acetaminophen, dextrose 50%, dextrose 50%, glucagon (human recombinant), insulin aspart U-100, ondansetron       Assessment/Plan:  Esophageal dysphagia due to extrinsic compression due to large   Multiloculated cystic structure extending from the tail of the pancreas to the GE  -EUS now    DM2- A1C 11.8    Patient condition:  Stable    Anticipated discharge and Disposition:  Discharge home once tolerating oral intake and cleared by Gastroenterology    All diagnosis and differential diagnosis have been reviewed; assessment and plan has been documented; I have personally reviewed the labs and test results that are presently available; I have reviewed the patients medication list; I have reviewed the consulting providers response and recommendations. I have reviewed or attempted to review medical records based upon their availability    All of the patient's questions have been  addressed and answered. Patient's is agreeable to the above stated plan. I will continue to monitor closely and make adjustments to medical management as needed.  _____________________________________________________________________    Nutrition Status:    Radiology:  FL Flouro Usage  Narrative: EXAMINATION:  FL FLOURO USAGE    CLINICAL HISTORY:  axios stent placement;    FINDINGS:  Fluoroscopic assistance provided during cyst gastrostomy.  Images were independently interpreted by the attending physician in the operating room.    Fluoro time 106 seconds.    Radiation dose 40.96 mGy.  Impression: Fluoroscopic assistance provided as above.    Electronically signed by: Bliar Rodriguez  Date:    06/17/2022  Time:    15:15      John Roger MD   06/17/2022

## 2022-06-18 NOTE — PROGRESS NOTES
Pt tolerating clear liquid diet post Axial stenting procedure yesterday with Dr. Carlson  NO PPI / H2 blockers  Carbonated beverages 4-6 oz every 4 hours while awake  Plan repeat EGD with Chio on Monday  VSS  Pt feeling better  Am labs pending

## 2022-06-19 LAB
BASOPHILS # BLD AUTO: 0.05 X10(3)/MCL (ref 0–0.2)
BASOPHILS NFR BLD AUTO: 0.9 %
EOSINOPHIL # BLD AUTO: 0.06 X10(3)/MCL (ref 0–0.9)
EOSINOPHIL NFR BLD AUTO: 1.1 %
ERYTHROCYTE [DISTWIDTH] IN BLOOD BY AUTOMATED COUNT: 13.3 % (ref 11.5–17)
HCT VFR BLD AUTO: 39.2 % (ref 42–52)
HGB BLD-MCNC: 12.3 GM/DL (ref 14–18)
IMM GRANULOCYTES # BLD AUTO: 0.02 X10(3)/MCL (ref 0–0.02)
IMM GRANULOCYTES NFR BLD AUTO: 0.4 % (ref 0–0.43)
LYMPHOCYTES # BLD AUTO: 0.59 X10(3)/MCL (ref 0.6–4.6)
LYMPHOCYTES NFR BLD AUTO: 11 %
MCH RBC QN AUTO: 26.3 PG (ref 27–31)
MCHC RBC AUTO-ENTMCNC: 31.4 MG/DL (ref 33–36)
MCV RBC AUTO: 83.9 FL (ref 80–94)
MONOCYTES # BLD AUTO: 0.78 X10(3)/MCL (ref 0.1–1.3)
MONOCYTES NFR BLD AUTO: 14.5 %
NEUTROPHILS # BLD AUTO: 3.9 X10(3)/MCL (ref 2.1–9.2)
NEUTROPHILS NFR BLD AUTO: 72.1 %
NRBC BLD AUTO-RTO: 0 %
PLATELET # BLD AUTO: 143 X10(3)/MCL (ref 130–400)
PMV BLD AUTO: 11.2 FL (ref 9.4–12.4)
POCT GLUCOSE: 106 MG/DL (ref 70–110)
POCT GLUCOSE: 116 MG/DL (ref 70–110)
POCT GLUCOSE: 181 MG/DL (ref 70–110)
POCT GLUCOSE: 223 MG/DL (ref 70–110)
RBC # BLD AUTO: 4.67 X10(6)/MCL (ref 4.7–6.1)
WBC # SPEC AUTO: 5.4 X10(3)/MCL (ref 4.5–11.5)

## 2022-06-19 PROCEDURE — 63600175 PHARM REV CODE 636 W HCPCS: Performed by: PHYSICIAN ASSISTANT

## 2022-06-19 PROCEDURE — 85025 COMPLETE CBC W/AUTO DIFF WBC: CPT | Performed by: NURSE PRACTITIONER

## 2022-06-19 PROCEDURE — 36415 COLL VENOUS BLD VENIPUNCTURE: CPT | Performed by: NURSE PRACTITIONER

## 2022-06-19 PROCEDURE — 63600175 PHARM REV CODE 636 W HCPCS: Performed by: NURSE PRACTITIONER

## 2022-06-19 PROCEDURE — 11000001 HC ACUTE MED/SURG PRIVATE ROOM

## 2022-06-19 RX ADMIN — INSULIN ASPART 2 UNITS: 100 INJECTION, SOLUTION INTRAVENOUS; SUBCUTANEOUS at 08:06

## 2022-06-19 RX ADMIN — LEVOFLOXACIN 500 MG: 500 INJECTION, SOLUTION INTRAVENOUS at 09:06

## 2022-06-19 NOTE — PROGRESS NOTES
Pt tolerating clear liquid diet post Axial stenting procedure Friday with Dr. Carlson  NO PPI / H2 blockers  Carbonated beverages 4-6 oz every 4 hours while awake  Plan repeat EGD with Chio on Monday  VSS  Pt feeling better  Am labs reviewed    NPO after midnight for EGD w/ marryo Monday with Dr. Carlson

## 2022-06-19 NOTE — PROGRESS NOTES
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 Trinh with Gastroenterology who presented to [facility] on 6/14/2022 with complaints of persistent dysphagia with an inability to tolerate oral intake and generalized weakness over the past 6 days. He states that he feels the food/liquids getting stuck in his throat when he eats which causes him to regurgitate.  He denied any nausea, vomiting, or abdominal pain and is able to tolerate his secretions.  GI was consulted while he was in the ED who took him for an EGD on 06/14/2022 for a possible food bolus, but during the procedure he was noted to have an esophageal stricture which was dilated.  The information about the procedure was obtained from the ED physician. Hospital Medicine was consulted for admission given the patient's inability to tolerate oral intake and GI will follow along.     Of note, he was seen in the ED on 05/08/2022 with similar symptoms but was able to tolerate oral intake after receiving IV glucagon and famotidine while in the ED so he was discharged home.     His vital signs are currently stable.  His lab showed a bicarbonate of 19, otherwise they were mostly unremarkable.      Interval Hx:   6/15/22-Patient is boarding in the emergency room .  Currently sleeping soundly .  No acute issues reported.  He remains NPO.  The patient had an EGD yesterday revealing extensive compression of the esophagus.  Later that evening he had a CT of the chest abdomen without contrast revealing a pancreas with a multiloculated 6 cystic structure extending from  the pancreas to the GE junction with overall measurement of approximately 11 cm x 8 cm.  Findings likely represent partial obstruction at the GE junction.    6/16/22 plans for EUS drainage if cystic peripancreatic fluid collection causing extrinsic compression of distal esophagus/GE junction/stomach causing dysphagia.    6/17/22 no complains . On his way to EUS for drainage of  Cystic pancreatic lesion     6/18/22 no complains. Feeling better    6/19/22- pt for egd tomorrow /voices no complains other than he is hungry.    Objective/physical exam:  General: in no acute distress  HENT: normocephalic, atraumatic  Eye: PERRL, EOMI, clear conjunctiva  Neck: full ROM, no thyromegaly, no JVD  Respiratory: clear to auscultation bilaterally  Cardiovascular: regular rate and rhythm, no murmur  Gastrointestinal: non-distended, positive bowel sounds, non-tender  Musculoskeletal: no gross deformity  Integumentary: warm, dry, intact, no rashes  Neurological: cranial nerves grossly intact, no focal neurological deficit  Psychiatric: cooperative, flat affect, depressed      VITAL SIGNS: 24 HRS MIN & MAX LAST   Temp  Min: 97.5 °F (36.4 °C)  Max: 99 °F (37.2 °C) 97.7 °F (36.5 °C)   BP  Min: 104/67  Max: 124/73 120/73   Pulse  Min: 71  Max: 84  71   Resp  Min: 18  Max: 18 18   SpO2  Min: 96 %  Max: 98 % 96 %       Recent Labs   Lab 06/14/22  0932 06/15/22  0356 06/19/22  0408   WBC 5.2 4.6 5.4   RBC 5.41 4.84 4.67*   HGB 14.4 13.0* 12.3*   HCT 44.4 40.4* 39.2*   MCV 82.1 83.5 83.9   MCH 26.6* 26.9* 26.3*   MCHC 32.4* 32.2* 31.4*   RDW 13.5 13.3 13.3    158 143   MPV 12.3 11.7 11.2       Recent Labs   Lab 06/14/22  0932 06/15/22  0356 06/18/22  0808    142 140   K 4.2 4.3 3.7   CO2 19* 21* 26   BUN 15.9 13.4 9.1   CREATININE 0.86 0.79 0.71*   CALCIUM 9.3 9.4 9.1   ALBUMIN 3.8 3.3* 3.3*   ALKPHOS 62 54 50   ALT 7 7 <5   AST 18 15 13   BILITOT 1.2 0.9 1.2          Microbiology Results (last 7 days)     ** No results found  for the last 168 hours. **           See below for Radiology    Scheduled Med:   levoFLOXacin  500 mg Intravenous Q24H        Continuous Infusions:   lactated ringers 125 mL/hr at 06/17/22 0336        PRN Meds:  acetaminophen, dextrose 50%, dextrose 50%, glucagon (human recombinant), insulin aspart U-100, ondansetron       Assessment/Plan:  Esophageal dysphagia due to extrinsic compression due to large   Multiloculated cystic structure extending from the tail of the pancreas to the GE  -EUS post axial stent  -noppi/h2  -allow carbonated drinks  -egd w fluro monday    DM2- A1C 11.8      Plan- EGD in am    Patient condition:  Stable    Anticipated discharge and Disposition:  Discharge home once tolerating oral intake and cleared by Gastroenterology    All diagnosis and differential diagnosis have been reviewed; assessment and plan has been documented; I have personally reviewed the labs and test results that are presently available; I have reviewed the patients medication list; I have reviewed the consulting providers response and recommendations. I have reviewed or attempted to review medical records based upon their availability    All of the patient's questions have been  addressed and answered. Patient's is agreeable to the above stated plan. I will continue to monitor closely and make adjustments to medical management as needed.  _____________________________________________________________________    Nutrition Status:    Radiology:  FL Flouro Usage  Narrative: EXAMINATION:  FL FLOURO USAGE    CLINICAL HISTORY:  axios stent placement;    FINDINGS:  Fluoroscopic assistance provided during cyst gastrostomy.  Images were independently interpreted by the attending physician in the operating room.    Fluoro time 106 seconds.    Radiation dose 40.96 mGy.  Impression: Fluoroscopic assistance provided as above.    Electronically signed by: Blair Rodriguez  Date:    06/17/2022  Time:    15:15      John Roger MD    06/19/2022

## 2022-06-20 ENCOUNTER — ANESTHESIA (OUTPATIENT)
Dept: ENDOSCOPY | Facility: HOSPITAL | Age: 57
DRG: 405 | End: 2022-06-20
Payer: COMMERCIAL

## 2022-06-20 ENCOUNTER — ANESTHESIA EVENT (OUTPATIENT)
Dept: ENDOSCOPY | Facility: HOSPITAL | Age: 57
DRG: 405 | End: 2022-06-20
Payer: COMMERCIAL

## 2022-06-20 LAB
POCT GLUCOSE: 108 MG/DL (ref 70–110)
POCT GLUCOSE: 114 MG/DL (ref 70–110)
POCT GLUCOSE: 116 MG/DL (ref 70–110)
POCT GLUCOSE: 135 MG/DL (ref 70–110)

## 2022-06-20 PROCEDURE — 25000003 PHARM REV CODE 250: Performed by: NURSE ANESTHETIST, CERTIFIED REGISTERED

## 2022-06-20 PROCEDURE — 48999 UNLISTED PROCEDURE PANCREAS: CPT | Performed by: INTERNAL MEDICINE

## 2022-06-20 PROCEDURE — 82962 GLUCOSE BLOOD TEST: CPT | Performed by: INTERNAL MEDICINE

## 2022-06-20 PROCEDURE — 11000001 HC ACUTE MED/SURG PRIVATE ROOM

## 2022-06-20 PROCEDURE — 63600175 PHARM REV CODE 636 W HCPCS: Performed by: NURSE ANESTHETIST, CERTIFIED REGISTERED

## 2022-06-20 PROCEDURE — 37000009 HC ANESTHESIA EA ADD 15 MINS: Performed by: INTERNAL MEDICINE

## 2022-06-20 PROCEDURE — 63600175 PHARM REV CODE 636 W HCPCS: Performed by: NURSE PRACTITIONER

## 2022-06-20 PROCEDURE — 37000008 HC ANESTHESIA 1ST 15 MINUTES: Performed by: INTERNAL MEDICINE

## 2022-06-20 RX ORDER — GLYCOPYRROLATE 0.2 MG/ML
INJECTION INTRAMUSCULAR; INTRAVENOUS
Status: DISCONTINUED | OUTPATIENT
Start: 2022-06-20 | End: 2022-06-20

## 2022-06-20 RX ORDER — LIDOCAINE HYDROCHLORIDE 20 MG/ML
INJECTION, SOLUTION EPIDURAL; INFILTRATION; INTRACAUDAL; PERINEURAL
Status: DISCONTINUED | OUTPATIENT
Start: 2022-06-20 | End: 2022-06-20

## 2022-06-20 RX ORDER — LIDOCAINE HYDROCHLORIDE 10 MG/ML
1 INJECTION, SOLUTION EPIDURAL; INFILTRATION; INTRACAUDAL; PERINEURAL ONCE
Status: DISCONTINUED | OUTPATIENT
Start: 2022-06-20 | End: 2022-06-22 | Stop reason: HOSPADM

## 2022-06-20 RX ORDER — SODIUM CHLORIDE, SODIUM GLUCONATE, SODIUM ACETATE, POTASSIUM CHLORIDE AND MAGNESIUM CHLORIDE 30; 37; 368; 526; 502 MG/100ML; MG/100ML; MG/100ML; MG/100ML; MG/100ML
1000 INJECTION, SOLUTION INTRAVENOUS CONTINUOUS
Status: DISCONTINUED | OUTPATIENT
Start: 2022-06-20 | End: 2022-06-22 | Stop reason: HOSPADM

## 2022-06-20 RX ORDER — PROPOFOL 10 MG/ML
VIAL (ML) INTRAVENOUS
Status: DISCONTINUED | OUTPATIENT
Start: 2022-06-20 | End: 2022-06-20

## 2022-06-20 RX ADMIN — PROPOFOL 50 MG: 10 INJECTION, EMULSION INTRAVENOUS at 03:06

## 2022-06-20 RX ADMIN — LIDOCAINE HYDROCHLORIDE 50 MG: 20 INJECTION, SOLUTION INTRAVENOUS at 03:06

## 2022-06-20 RX ADMIN — SODIUM CHLORIDE, SODIUM GLUCONATE, SODIUM ACETATE, POTASSIUM CHLORIDE AND MAGNESIUM CHLORIDE: 526; 502; 368; 37; 30 INJECTION, SOLUTION INTRAVENOUS at 03:06

## 2022-06-20 RX ADMIN — LEVOFLOXACIN 500 MG: 500 INJECTION, SOLUTION INTRAVENOUS at 09:06

## 2022-06-20 RX ADMIN — GLYCOPYRROLATE 0.1 MG: 0.2 INJECTION INTRAMUSCULAR; INTRAVENOUS at 03:06

## 2022-06-20 NOTE — ANESTHESIA POSTPROCEDURE EVALUATION
Anesthesia Post Evaluation    Patient: Tato Schmidt    Procedure(s) Performed: Procedure(s) (LRB):  Upper EUS w/poss FNA. (N/A)  EGD (ESOPHAGOGASTRODUODENOSCOPY) (N/A)  EGD, WITH STENT INSERTION  EGD, WITH BALLOON DILATION    Final Anesthesia Type: general      Patient location during evaluation: floor  Patient participation: Yes- Able to Participate  Level of consciousness: awake  Post-procedure vital signs: reviewed and stable  Pain management: adequate  Airway patency: patent    PONV status at discharge: vomiting (controlled) and nausea (inadequately controlled)  Anesthetic complications: no      Cardiovascular status: hemodynamically stable  Respiratory status: spontaneous ventilation and unassisted  Hydration status: euvolemic  Follow-up not needed.  Comments:              Vitals Value Taken Time   BP 97/66 06/20/22 1044   Temp 36.6 °C (97.9 °F) 06/20/22 1044   Pulse 82 06/20/22 1044   Resp 20 06/20/22 1044   SpO2 98 % 06/20/22 1044         Event Time   Out of Recovery 06/17/2022 15:20:00         Pain/Carrillo Score: No data recorded

## 2022-06-20 NOTE — PROVATION PATIENT INSTRUCTIONS
Discharge Summary/Instructions after an Endoscopic Procedure  Patient Name: Tato Schmidt  Patient MRN: [id number]  Patient YOB: 1965 Monday, June 20, 2022  Eric Carlson MD  Dear patient,  As a result of recent federal legislation (The Federal Cures Act), you may   receive lab or pathology results from your procedure in your [facility]   account before your physician is able to contact you. Your physician or   their representative will relay the results to you with their   recommendations at their soonest availability.  Thank you,  RESTRICTIONS:  During your procedure today, you received medications for sedation.  These   medications may affect your judgment, balance and coordination.  Therefore,   for 24 hours, you have the following restrictions:   - DO NOT drive a car, operate machinery, make legal/financial decisions,   sign important papers or drink alcohol.    ACTIVITY:  Today: no heavy lifting, straining or running due to procedural   sedation/anesthesia.  The following day: return to full activity including work.  DIET:  Eat and drink normally unless instructed otherwise.     TREATMENT FOR COMMON SIDE EFFECTS:  - Mild abdominal pain, nausea, belching, bloating or excessive gas:  rest,   eat lightly and use a heating pad.  - Sore Throat: treat with throat lozenges and/or gargle with warm salt   water.  - Because air was used during the procedure, expelling large amounts of air   from your rectum or belching is normal.  - If a bowel prep was taken, you may not have a bowel movement for 1-3 days.    This is normal.  SYMPTOMS TO WATCH FOR AND REPORT TO YOUR PHYSICIAN:  1. Abdominal pain or bloating, other than gas cramps.  2. Chest pain.  3. Back pain.  4. Signs of infection such as: chills or fever occurring within 24 hours   after the procedure.  5. Rectal bleeding, which would show as bright red, maroon, or black stools.   (A tablespoon of blood from the rectum is not serious, especially if    hemorrhoids are present.)  6. Vomiting.  7. Weakness or dizziness.  GO DIRECTLY TO THE NEAREST EMERGENCY ROOM IF YOU HAVE ANY OF THE FOLLOWING:      Difficulty breathing              Chills and/or fever over 101 F   Persistent vomiting and/or vomiting blood   Severe abdominal pain   Severe chest pain   Black, tarry stools   Bleeding- more than one tablespoon   Any other symptom or condition that you feel may need urgent attention  Your doctor recommends these additional instructions:  If any biopsies were taken, your doctors clinic will contact you in 1 to 2   weeks with any results.  - Return patient to hospital fam for ongoing care.   - Full liquid diet today, then advance as tolerated to soft diet and low   residue diet until the stent is removed.   - Continue present medications.   - Observe patient's clinical course.   - Repeat upper endoscopy in 2 weeks for stent removal.   - The findings and recommendations were discussed with the patient.  For questions, problems or results please call your physician - Eric Carlson MD at Work:  [phone].  [facility], EMERGENCY ROOM PHONE NUMBER: [phone]  IF A COMPLICATION OR EMERGENCY SITUATION ARISES AND YOU ARE UNABLE TO REACH   YOUR PHYSICIAN - GO DIRECTLY TO THE EMERGENCY ROOM.  Eric Carlson MD  6/20/2022 4:30:07 PM  This report has been verified and signed electronically.  Dear patient,  As a result of recent federal legislation (The Federal Cures Act), you may   receive lab or pathology results from your procedure in your [facility]   account before your physician is able to contact you. Your physician or   their representative will relay the results to you with their   recommendations at their soonest availability.  Thank you,  PROVATION

## 2022-06-20 NOTE — ANESTHESIA PREPROCEDURE EVALUATION
06/20/2022  Tato Schmidt is a 57 y.o., male.      Pre-op Assessment    I have reviewed the Patient Summary Reports.     I have reviewed the Nursing Notes. I have reviewed the NPO Status.   I have reviewed the Medications.     Review of Systems      Physical Exam  General: Well nourished and Cooperative    Airway:  Mallampati: II   Mouth Opening: Normal  TM Distance: Normal  Tongue: Normal  Neck ROM: Normal ROM    Dental:  Intact    Chest/Lungs:  Clear to auscultation    Heart:  Rate: Normal        Anesthesia Plan  Type of Anesthesia, risks & benefits discussed:    Anesthesia Type: Gen Natural Airway  Intra-op Monitoring Plan: Standard ASA Monitors  Induction:  IV  Informed Consent: Informed consent signed with the Patient and all parties understand the risks and agree with anesthesia plan.  All questions answered.   ASA Score: 2  Day of Surgery Review of History & Physical: H&P Update referred to the surgeon/provider.    Ready For Surgery From Anesthesia Perspective.     .

## 2022-06-20 NOTE — TRANSFER OF CARE
"Anesthesia Transfer of Care Note    Patient: Tato Schmidt    Procedure(s) Performed: Procedure(s) (LRB):  EGD w/ Carline (N/A)    Anesthesia Type: other:    Transport from OR: Transported from OR on room air with adequate spontaneous ventilation    Post pain: adequate analgesia    Post vital signs: stable    Level of consciousness: sedated and responds to stimulation    Nausea/Vomiting: no nausea/vomiting    Complications: none    Transfer of care protocol was followed      Last vitals:   Visit Vitals  /68 (BP Location: Right arm, Patient Position: Lying)   Pulse 70   Temp 36.3 °C (97.3 °F) (Tympanic)   Resp (!) 22   Ht 5' 10" (1.778 m)   Wt 89.8 kg (198 lb)   SpO2 99%   BMI 28.41 kg/m²     "

## 2022-06-20 NOTE — ANESTHESIA POSTPROCEDURE EVALUATION
Anesthesia Post Evaluation    Patient: Tato Schmidt    Procedure(s) Performed: Procedure(s) (LRB):  EGD w/ Fluro (N/A)  DEBRIDEMENT, NECROTIZING PANCREATITIS    Final Anesthesia Type: general      Patient location during evaluation: PACU  Patient participation: Yes- Able to Participate  Level of consciousness: awake  Post-procedure vital signs: reviewed and stable  Pain management: adequate  Airway patency: patent    PONV status at discharge: vomiting (controlled) and nausea (inadequately controlled)  Anesthetic complications: no      Cardiovascular status: hemodynamically stable  Respiratory status: spontaneous ventilation and unassisted  Hydration status: euvolemic  Follow-up not needed.  Comments:              Vitals Value Taken Time   BP 98/58 06/20/22 1607   Temp 36.1 °C (97 °F) 06/20/22 1607   Pulse 65 06/20/22 1607   Resp 17 06/20/22 1607   SpO2 100 % 06/20/22 1607         No case tracking events are documented in the log.      Pain/Carrillo Score: Carrillo Score: 8 (6/20/2022  4:07 PM)

## 2022-06-21 LAB
ANION GAP SERPL CALC-SCNC: 10 MEQ/L
BASOPHILS # BLD AUTO: 0.05 X10(3)/MCL (ref 0–0.2)
BASOPHILS NFR BLD AUTO: 1.3 %
BUN SERPL-MCNC: 7.3 MG/DL (ref 8.4–25.7)
CALCIUM SERPL-MCNC: 9 MG/DL (ref 8.4–10.2)
CHLORIDE SERPL-SCNC: 101 MMOL/L (ref 98–107)
CO2 SERPL-SCNC: 27 MMOL/L (ref 22–29)
CREAT SERPL-MCNC: 0.67 MG/DL (ref 0.73–1.18)
CREAT/UREA NIT SERPL: 11
EOSINOPHIL # BLD AUTO: 0.07 X10(3)/MCL (ref 0–0.9)
EOSINOPHIL NFR BLD AUTO: 1.8 %
ERYTHROCYTE [DISTWIDTH] IN BLOOD BY AUTOMATED COUNT: 13.1 % (ref 11.5–17)
GLUCOSE SERPL-MCNC: 158 MG/DL (ref 74–100)
HCT VFR BLD AUTO: 36.5 % (ref 42–52)
HGB BLD-MCNC: 11.6 GM/DL (ref 14–18)
IMM GRANULOCYTES # BLD AUTO: 0.02 X10(3)/MCL (ref 0–0.02)
IMM GRANULOCYTES NFR BLD AUTO: 0.5 % (ref 0–0.43)
LIPASE SERPL-CCNC: 8 U/L
LYMPHOCYTES # BLD AUTO: 0.66 X10(3)/MCL (ref 0.6–4.6)
LYMPHOCYTES NFR BLD AUTO: 16.8 %
MCH RBC QN AUTO: 26.5 PG (ref 27–31)
MCHC RBC AUTO-ENTMCNC: 31.8 MG/DL (ref 33–36)
MCV RBC AUTO: 83.5 FL (ref 80–94)
MONOCYTES # BLD AUTO: 0.6 X10(3)/MCL (ref 0.1–1.3)
MONOCYTES NFR BLD AUTO: 15.2 %
NEUTROPHILS # BLD AUTO: 2.5 X10(3)/MCL (ref 2.1–9.2)
NEUTROPHILS NFR BLD AUTO: 64.4 %
NRBC BLD AUTO-RTO: 0 %
PLATELET # BLD AUTO: 191 X10(3)/MCL (ref 130–400)
PMV BLD AUTO: 11.8 FL (ref 9.4–12.4)
POCT GLUCOSE: 129 MG/DL (ref 70–110)
POCT GLUCOSE: 147 MG/DL (ref 70–110)
POCT GLUCOSE: 181 MG/DL (ref 70–110)
POCT GLUCOSE: 196 MG/DL (ref 70–110)
POCT GLUCOSE: 206 MG/DL (ref 70–110)
POTASSIUM SERPL-SCNC: 3.5 MMOL/L (ref 3.5–5.1)
RBC # BLD AUTO: 4.37 X10(6)/MCL (ref 4.7–6.1)
SODIUM SERPL-SCNC: 138 MMOL/L (ref 136–145)
WBC # SPEC AUTO: 3.9 X10(3)/MCL (ref 4.5–11.5)

## 2022-06-21 PROCEDURE — 63600175 PHARM REV CODE 636 W HCPCS: Performed by: PHYSICIAN ASSISTANT

## 2022-06-21 PROCEDURE — 85025 COMPLETE CBC W/AUTO DIFF WBC: CPT | Performed by: INTERNAL MEDICINE

## 2022-06-21 PROCEDURE — 63600175 PHARM REV CODE 636 W HCPCS: Performed by: NURSE PRACTITIONER

## 2022-06-21 PROCEDURE — 11000001 HC ACUTE MED/SURG PRIVATE ROOM

## 2022-06-21 PROCEDURE — 36415 COLL VENOUS BLD VENIPUNCTURE: CPT | Performed by: INTERNAL MEDICINE

## 2022-06-21 PROCEDURE — 80048 BASIC METABOLIC PNL TOTAL CA: CPT | Performed by: INTERNAL MEDICINE

## 2022-06-21 PROCEDURE — 83690 ASSAY OF LIPASE: CPT | Performed by: INTERNAL MEDICINE

## 2022-06-21 RX ADMIN — LEVOFLOXACIN 500 MG: 500 INJECTION, SOLUTION INTRAVENOUS at 08:06

## 2022-06-21 RX ADMIN — INSULIN ASPART 2 UNITS: 100 INJECTION, SOLUTION INTRAVENOUS; SUBCUTANEOUS at 05:06

## 2022-06-21 NOTE — SUBJECTIVE & OBJECTIVE
Subjective:     Interval History: Initial History of Present Illness (HPI): 06/16/22  This is a 57 y.o. male presented to ER with dysphagia.   Dysphagia has been an issue since early May.  EGD 6/15/22: Extrinsic compression of the lower esophagus (bxed but not suspected to be significant findings). Dilated empirically to 50 fr. Moderate hemorrhogic gastritis, bxed. Moderate extrinsic compression of the duodenal sweep/bulb. Unable to get passed with standard gastroscope, exchanged for the colonoscopy and were able to eventually get through.  CT chest/abdomen wo contrast 6/14/22: Cicatrizing appearance of pancreas with multiloculated cystic structure extending from pancreas to GE junction within overall measurement of approximately 11 by 8 cm.  There is additional peripherally calcified cystic lesion adjacent to the small bowel within left upper quadrant measuring 7 cm.  Oral contrast is noted in stomach but is dense within the esophagus and findings likely represent partial obstruction at the GE junction.  Dr. Carlson is consulted for possible EUS evaluation and drainage.  He has asked Dr. Federico Goyal to evaluate as well.     Seen in past (2019) by Dr. Carlson for dysphagia.  EGD was ordered, scheduled several times but either rescheduled or cancelled and never accomplished.        Seen in 2010 by Dr. Panchito Trinh for gallstone pancreatitis  ERCP: 2010: Extrinsic compression of stomach. 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 Pancreatic sphincterotomy was accomplished.  He was sent to Dr. Dread Galloway at [place] for placement of a pancreatic stent . Stent was removed after patient clinically improved.  CT in 2011 revealed pancreatic pseudocysts. These were drained.  Was lost to follow up until 2019.    06/17/22 EUS:  Impression:            - Extrinsic compression in the lower third of the                          esophagus.                          -  Extrinsic compression in the cardia.                          - Extrinsic compression in the prepyloric region                          of the stomach.                          - Extrinsic compression was noted in the                          gastroesophageal junction pancreatic pseudocyst.                          - Extrinsic compression was noted in the cardia of                          the stomach due to a pancreatic cyst/pseudocyst.                          Dilated.                          - Extrinsic compression was noted in the duodenal                          bulb and in the apex of the duodenal bulb.                          - Cystogastrostomy was performed.                          - Necrosectomy was performed.   Recommendation:        - Return patient to hospital fam for ongoing care.                          - Clear liquid diet for 3 days.                          - Continue present medications.                          - Avoid PPI & H2 blockers                          - 4-6 oz of carbonated beverages every 4 hrs while                          awake.                          - Levaquin (levofloxacin) 500 mg IV daily until                          discharge.                          - Perform an upper GI endoscopy with fluro on                          Monday to reassess cavity.                          - The findings and recommendations were discussed                          with the patient's family.     06/20/22 EGD with fluoro:  Impression:            - Normal esophagus.                          - Pre-existing gastric stent.                          - Duodenal deformity.                          - Necrosectomy was performed. The cavity has                          decreased in size considerably over the past 3                          days.   Recommendation:        - Return patient to hospital fam for ongoing care.                          - Full liquid diet today, then advance as                           tolerated to soft diet and low residue diet until                          the stent is removed.                          - Continue present medications.                          - Observe patient's clinical course.                          - Repeat upper endoscopy in 2 weeks for stent                          removal.                          - The findings and recommendations were discussed                          with the patient.     06/21/22:  Lipase wnl.  Patient is doing well and in good spirits today. He is sitting on the edge of the bed doing word puzzles.  Patient tolerated clear and full liquid diets last night and this morning. He states he is not very hungry but he ate breakfast recently. Will progress to low residue solid diet.  Patient desires to go home since he is feeling much better.   Discussed pancreatitis risk factors and prevention with patient.    Review of Systems   Constitutional:  Negative for chills, fever and unexpected weight change.   HENT:  Negative for mouth sores, sore throat and trouble swallowing.    Eyes:  Negative for discharge and visual disturbance.   Respiratory:  Negative for apnea, cough, shortness of breath and wheezing.    Cardiovascular:  Negative for chest pain, palpitations and leg swelling.   Gastrointestinal:  Negative for abdominal distention, abdominal pain, blood in stool, nausea and vomiting.        Denies heartburn, nausea, vomiting, dysphagia, globus sensation, hematemesis, epigastric pain.   Denies Abdominal pain, tenderness  Denies constipation, diarrhea, BRBPR   Skin:  Negative for color change.   Neurological:  Negative for dizziness, syncope, weakness and headaches.   Psychiatric/Behavioral:  Negative for agitation, confusion and hallucinations.    Objective:     Vital Signs (Most Recent):  Temp: 97.8 °F (36.6 °C) (06/21/22 0807)  Pulse: 76 (06/21/22 0807)  Resp: 18 (06/21/22 0807)  BP: 103/66 (06/21/22 0807)  SpO2: 100 % (06/21/22 0807) Vital  Signs (24h Range):  Temp:  [97 °F (36.1 °C)-98 °F (36.7 °C)] 97.8 °F (36.6 °C)  Pulse:  [63-76] 76  Resp:  [14-22] 18  SpO2:  [96 %-100 %] 100 %  BP: ()/(58-75) 103/66     Weight: 89.8 kg (198 lb) (06/20/22 1430)  Body mass index is 28.41 kg/m².      Intake/Output Summary (Last 24 hours) at 6/21/2022 1053  Last data filed at 6/21/2022 0500  Gross per 24 hour   Intake 860 ml   Output --   Net 860 ml       Lines/Drains/Airways       Peripheral Intravenous Line  Duration                  Peripheral IV - Single Lumen 06/17/22 2230 20 G Anterior;Distal;Left Upper Arm 3 days                    Physical Exam  Constitutional:       General: He is not in acute distress.     Appearance: Normal appearance. He is normal weight. He is not ill-appearing.   HENT:      Head: Normocephalic and atraumatic.      Right Ear: External ear normal.      Left Ear: External ear normal.      Nose: Nose normal.      Mouth/Throat:      Mouth: Mucous membranes are moist.   Eyes:      Conjunctiva/sclera: Conjunctivae normal.   Cardiovascular:      Rate and Rhythm: Normal rate and regular rhythm.      Heart sounds: Normal heart sounds.   Pulmonary:      Effort: Pulmonary effort is normal. No respiratory distress.      Breath sounds: Normal breath sounds. No stridor. No wheezing or rhonchi.   Abdominal:      General: Abdomen is flat. There is no distension.      Tenderness: There is no abdominal tenderness. There is no guarding.   Musculoskeletal:         General: Normal range of motion.      Cervical back: Normal range of motion.   Skin:     General: Skin is warm and dry.   Neurological:      Mental Status: He is alert and oriented to person, place, and time. Mental status is at baseline.   Psychiatric:         Mood and Affect: Mood normal.         Behavior: Behavior normal.         Thought Content: Thought content normal.       Significant Labs:  Recent Lab Results  (Last 5 results in the past 24 hours)        06/21/22  0196    06/21/22  0351   06/20/22  2056   06/20/22  1737   06/20/22  1432        Anion Gap   10.0             Baso #   0.05             Basophil %   1.3             BUN   7.3             BUN/CREAT RATIO   11             Calcium   9.0             Chloride   101             CO2   27             Creatinine   0.67             eGFR if    >60             Eos #   0.07             Eosinophil %   1.8             Glucose   158             Hematocrit   36.5             Hemoglobin   11.6             Immature Grans (Abs)   0.02             Immature Granulocytes   0.5             Lipase   8             Lymph #   0.66             LYMPH %   16.8             MCH   26.5             MCHC   31.8             MCV   83.5             Mono #   0.60             Mono %   15.2             MPV   11.8             Neut #   2.5             Neut %   64.4             nRBC   0.0             Platelets   191             POCT Glucose 129     181   116   108       Potassium   3.5             RBC   4.37             RDW   13.1             Sodium   138             WBC   3.9                                      Significant Imaging:  N/a

## 2022-06-21 NOTE — ASSESSMENT & PLAN NOTE
Improved s/p stent placement  Patient instructed to avoid PPIs and H2 blockers  Patient instructed to drink carbonated beverages every 4 hours  Repeat EGD in 2 weeks outpatient for stent removal  Will f/u with Dr. Carlson  Will likely d/c tomorrow if he tolerates low residue diet

## 2022-06-21 NOTE — PROGRESS NOTES
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 Trinh with Gastroenterology who presented to [facility] on 6/14/2022 with complaints of persistent dysphagia with an inability to tolerate oral intake and generalized weakness over the past 6 days. He states that he feels the food/liquids getting stuck in his throat when he eats which causes him to regurgitate.  He denied any nausea, vomiting, or abdominal pain and is able to tolerate his secretions.  GI was consulted while he was in the ED who took him for an EGD on 06/14/2022 for a possible food bolus, but during the procedure he was noted to have an esophageal stricture which was dilated.  The information about the procedure was obtained from the ED physician. Hospital Medicine was consulted for admission given the patient's inability to tolerate oral intake and GI will follow along.     Of note, he was seen in the ED on 05/08/2022 with similar symptoms but was able to tolerate oral intake after receiving IV glucagon and famotidine while in the ED so he was discharged home.     His vital signs are currently stable.  His lab showed a bicarbonate of 19, otherwise they were mostly unremarkable.      Interval Hx:   6/15/22-Patient is boarding in the emergency room .  Currently sleeping soundly .  No acute issues reported.  He remains NPO.  The patient had an EGD yesterday revealing extensive compression of the esophagus.  Later that evening he had a CT of the chest abdomen without contrast revealing a pancreas with a multiloculated 6 cystic structure extending from  the pancreas to the GE junction with overall measurement of approximately 11 cm x 8 cm.  Findings likely represent partial obstruction at the GE junction.    6/16/22 plans for EUS drainage if cystic peripancreatic fluid collection causing extrinsic compression of distal esophagus/GE junction/stomach causing dysphagia.    6/17/22 no complains . On his way to EUS for drainage of  Cystic pancreatic lesion     6/18/22 no complains. Feeling better    6/19/22- pt for egd tomorrow /voices no complains other than he is hungry.    6/20/22 post EGD w/ flouro  To verify  Axial stenting patency/ draining(awaiting procedure report)    Objective/physical exam:  General: in no acute distress  HENT: normocephalic, atraumatic  Eye: PERRL, EOMI, clear conjunctiva  Neck: full ROM, no thyromegaly, no JVD  Respiratory: clear to auscultation bilaterally  Cardiovascular: regular rate and rhythm, no murmur  Gastrointestinal: non-distended, positive bowel sounds, non-tender  Musculoskeletal: no gross deformity  Integumentary: warm, dry, intact, no rashes  Neurological: cranial nerves grossly intact, no focal neurological deficit  Psychiatric: cooperative, flat affect, depressed      VITAL SIGNS: 24 HRS MIN & MAX LAST   Temp  Min: 97 °F (36.1 °C)  Max: 97.9 °F (36.6 °C) 97.9 °F (36.6 °C)   BP  Min: 97/66  Max: 119/74 106/67   Pulse  Min: 63  Max: 82  71   Resp  Min: 14  Max: 22 16   SpO2  Min: 96 %  Max: 100 % 97 %       Recent Labs   Lab 06/14/22  0932 06/15/22  0356 06/19/22  0408   WBC 5.2 4.6 5.4   RBC 5.41 4.84 4.67*   HGB 14.4 13.0* 12.3*   HCT 44.4 40.4* 39.2*   MCV 82.1 83.5 83.9   MCH 26.6* 26.9* 26.3*   MCHC 32.4* 32.2* 31.4*   RDW 13.5 13.3 13.3    158 143   MPV 12.3 11.7 11.2       Recent Labs   Lab 06/14/22  0932 06/15/22  0356 06/18/22  0808    142 140   K 4.2 4.3 3.7   CO2 19* 21* 26   BUN 15.9 13.4 9.1   CREATININE 0.86 0.79 0.71*   CALCIUM 9.3 9.4 9.1   ALBUMIN 3.8 3.3* 3.3*   ALKPHOS 62 54 50   ALT 7 7 <5   AST  18 15 13   BILITOT 1.2 0.9 1.2          Microbiology Results (last 7 days)     ** No results found for the last 168 hours. **           See below for Radiology    Scheduled Med:   levoFLOXacin  500 mg Intravenous Q24H    LIDOcaine (PF) 10 mg/ml (1%)  1 mL Intradermal Once        Continuous Infusions:   electrolyte-A Stopped (06/20/22 1530)    lactated ringers 125 mL/hr at 06/17/22 0336        PRN Meds:  acetaminophen, dextrose 50%, dextrose 50%, glucagon (human recombinant), insulin aspart U-100, ondansetron       Assessment/Plan:  Esophageal dysphagia due to extrinsic compression due to large   Multiloculated cystic structure extending from the tail of the pancreas to the GE  -EUS post axial stent, egd repeated today  w althea to verify patency/draining. Await further recs by GI  -no ppi/h2  -allow carbonated drinks      DM2- A1C 11.8      Plan- await final recs by GI/await egd w althea report    Patient condition:  Stable    Anticipated discharge and Disposition:  Discharge home once tolerating oral intake and cleared by Gastroenterology    All diagnosis and differential diagnosis have been reviewed; assessment and plan has been documented; I have personally reviewed the labs and test results that are presently available; I have reviewed the patients medication list; I have reviewed the consulting providers response and recommendations. I have reviewed or attempted to review medical records based upon their availability    All of the patient's questions have been  addressed and answered. Patient's is agreeable to the above stated plan. I will continue to monitor closely and make adjustments to medical management as needed.  _____________________________________________________________________    Nutrition Status:    Radiology:  SURG FL Surgery Fluoro Usage  See OP Notes for results.     IMPRESSION: See OP Notes for results.     This procedure was auto-finalized by: Virtual Radiologist      John Roger MD    06/20/2022

## 2022-06-21 NOTE — PROGRESS NOTES
[facility] - Observation [facility]  Adult Nutrition  Progress Note    SUMMARY       Recommendations    Recommendation/Intervention:   1. Continue diet as ordered. Monitor CBGs as po intake increases, may need to add diabetic restriction.   2. Trial strawberry Boost Glucose Control TID to increase po intake (250kcals, 14g protein).       Assessment and Plan  Nutrition Problem  Malnutrition    Related to (etiology):   Dysphagia/pancreatic cyst    Signs and Symptoms (as evidenced by):   <50% >/=5 days, >2% wt loss x1wk     Interventions(treatment strategy):  Modified composition of meals / snacks, Commercial beverage and Collaboration with other providers    Nutrition Diagnosis Status:   Continues    Goals: 1. Maintain wt throughout hospitalization. 2. Meet >75% nutritional needs by follow-up.  Nutrition Goal Status: progressing to goal       Malnutrition Assessment  Malnutrition Type: acute illness or injury (moderate)  Energy Intake: moderate energy intake          Weight Loss (Malnutrition): greater than 2% in 1 week  Energy Intake (Malnutrition): less than or equal to 50% for greater than or equal to 5 days  Subcutaneous Fat (Malnutrition):  (does not meet criteria)  Muscle Mass (Malnutrition):  (does not meet criteria)  Fluid Accumulation (Malnutrition):  (does not meet criteria)     Reason for Assessment    Reason For Assessment:  identified at risk by screening criteria  Diagnosis:    1. Dysphagia to solids and liquids - due to extrinsic compression from pancreatic cyst    2. 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 States reduced po intake x1 wk s/t worsening dysphagia; solids and liquids unable to stay down. With ~7lb wt loss. NPO today  "for procedure. Will leave TF and PPN recommendations incase unable to advance diet.    6/21: Pt placed on diet, tolerated liquids yesterday. Appetite remains decreased. Will add Boost ONS to trial.     Nutrition Risk Screen    Nutrition Risk Screen: dysphagia or difficulty swallowing    Nutrition/Diet History    Factors Affecting Nutritional Intake: nausea/vomiting, altered gastrointestinal function, difficulty/impaired swallowing    Anthropometrics    Temp: 97.5 °F (36.4 °C)  Height Method: Stated  Height: 5' 10" (177.8 cm)  Height (inches): 70 in  Weight Method: Stated  Weight: 89.8 kg (198 lb)  Weight (lb): 198 lb  Ideal Body Weight (IBW), Male: 166 lb  % Ideal Body Weight, Male (lb): 119.28 %  BMI (Calculated): 28.4  BMI Grade: (P) 25 - 29.9 - overweight  Usual Body Weight (UBW), kg: (P) 93.18 kg  Weight Change Amount: (P) 7 lb  % Usual Body Weight: (P) 96.9  % Weight Change From Usual Weight: (P) -3.31 %       Lab/Procedures/Meds    Pertinent Labs Reviewed:  reviewed  Pertinent Labs Comments: 6/21: H/H 11.6/36.5L, Gluc 158H, CBGs 108-181H  Pertinent Medications Reviewed: reviewed  Pertinent Medications Comments: LR @ 125ml/hr, zofran, SSI    Estimated/Assessed Needs    Weight Used For Calorie Calculations: 90.1 kg (198 lb 10.2 oz)  Energy Calorie Requirements (kcal): 2072kcals/d (MSJ x 1.2SF)  Energy Need Method: [place]  Protein Requirements: 108g/d (1.2g/kg)  Weight Used For Protein Calculations: 90.1 kg (198 lb 10.2 oz)  Fluid Requirements (mL): 2072ml fl/d  Estimated Fluid Requirement Method: RDA Method  RDA Method (mL): 2072    Nutrition Prescription Ordered    Current Diet Order: low residue     Evaluation of Received Nutrient/Fluid Intake    Tolerance: not tolerating  % Intake of Estimated Energy Needs: 25 - 50 %  % Meal Intake: 25 - 50 %    Nutrition Risk    Level of Risk/Frequency of Follow-up: moderate    Monitor and Evaluation    Food and Nutrient Intake: food and beverage intake, energy " intake  Food and Nutrient Adminstration: diet order  Anthropometric Measurements: weight change  Biochemical Data, Medical Tests and Procedures: glucose/endocrine profile, electrolyte and renal panel     Nutrition Follow-Up    RD Follow-up?: Yes

## 2022-06-21 NOTE — PROGRESS NOTES
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 Trinh with Gastroenterology who presented to [facility] on 6/14/2022 with complaints of persistent dysphagia with an inability to tolerate oral intake and generalized weakness over the past 6 days. He states that he feels the food/liquids getting stuck in his throat when he eats which causes him to regurgitate.  He denied any nausea, vomiting, or abdominal pain and is able to tolerate his secretions.  GI was consulted while he was in the ED who took him for an EGD on 06/14/2022 for a possible food bolus, but during the procedure he was noted to have an esophageal stricture which was dilated.  The information about the procedure was obtained from the ED physician. Hospital Medicine was consulted for admission given the patient's inability to tolerate oral intake and GI will follow along.     Of note, he was seen in the ED on 05/08/2022 with similar symptoms but was able to tolerate oral intake after receiving IV glucagon and famotidine while in the ED so he was discharged home.     His vital signs are currently stable.  His lab showed a bicarbonate of 19, otherwise they were mostly unremarkable.      Interval Hx:   6/15/22-Patient is boarding in the emergency room .  Currently sleeping soundly .  No acute issues reported.  He remains NPO.  The patient had an EGD yesterday revealing extensive compression of the esophagus.  Later that evening he had a CT of the chest abdomen without contrast revealing a pancreas with a multiloculated 6 cystic structure extending from  the pancreas to the GE junction with overall measurement of approximately 11 cm x 8 cm.  Findings likely represent partial obstruction at the GE junction.    6/16/22 plans for EUS drainage if cystic peripancreatic fluid collection causing extrinsic compression of distal esophagus/GE junction/stomach causing dysphagia.    6/17/22 no complains . On his way to EUS for drainage of  Cystic pancreatic lesion     6/18/22 no complains. Feeling better    6/19/22- pt for egd tomorrow /voices no complains other than he is hungry.    6/20/22 post EGD w/ flouro  To verify  Axial stenting patency/ draining(awaiting procedure report)    Objective/physical exam:  General: in no acute distress  HENT: normocephalic, atraumatic  Eye: PERRL, EOMI, clear conjunctiva  Neck: full ROM, no thyromegaly, no JVD  Respiratory: clear to auscultation bilaterally  Cardiovascular: regular rate and rhythm, no murmur  Gastrointestinal: non-distended, positive bowel sounds, non-tender  Musculoskeletal: no gross deformity  Integumentary: warm, dry, intact, no rashes  Neurological: cranial nerves grossly intact, no focal neurological deficit  Psychiatric: cooperative, flat affect, depressed      VITAL SIGNS: 24 HRS MIN & MAX LAST   Temp  Min: 97 °F (36.1 °C)  Max: 97.9 °F (36.6 °C) 97.9 °F (36.6 °C)   BP  Min: 97/66  Max: 119/74 106/67   Pulse  Min: 63  Max: 82  71   Resp  Min: 14  Max: 22 16   SpO2  Min: 96 %  Max: 100 % 97 %       Recent Labs   Lab 06/14/22  0932 06/15/22  0356 06/19/22  0408   WBC 5.2 4.6 5.4   RBC 5.41 4.84 4.67*   HGB 14.4 13.0* 12.3*   HCT 44.4 40.4* 39.2*   MCV 82.1 83.5 83.9   MCH 26.6* 26.9* 26.3*   MCHC 32.4* 32.2* 31.4*   RDW 13.5 13.3 13.3    158 143   MPV 12.3 11.7 11.2       Recent Labs   Lab 06/14/22  0932 06/15/22  0356 06/18/22  0808    142 140   K 4.2 4.3 3.7   CO2 19* 21* 26   BUN 15.9 13.4 9.1   CREATININE 0.86 0.79 0.71*   CALCIUM 9.3 9.4 9.1   ALBUMIN 3.8 3.3* 3.3*   ALKPHOS 62 54 50   ALT 7 7 <5   AST  18 15 13   BILITOT 1.2 0.9 1.2          Microbiology Results (last 7 days)     ** No results found for the last 168 hours. **           See below for Radiology    Scheduled Med:   levoFLOXacin  500 mg Intravenous Q24H    LIDOcaine (PF) 10 mg/ml (1%)  1 mL Intradermal Once        Continuous Infusions:   electrolyte-A Stopped (06/20/22 1530)    lactated ringers 125 mL/hr at 06/17/22 0336        PRN Meds:  acetaminophen, dextrose 50%, dextrose 50%, glucagon (human recombinant), insulin aspart U-100, ondansetron       Assessment/Plan:  Esophageal dysphagia due to extrinsic compression due to large   Multiloculated cystic structure extending from the tail of the pancreas to the GE  -EUS post axial stent, egd repeated today  w althea to verify patency/draining. Await further recs by GI  -no ppi/h2  -allow carbonated drinks      DM2- A1C 11.8, uncontrolled    elevated lipase      Plan- await final recs by GI/await egd linda oh report    Patient condition:  Stable    Anticipated discharge and Disposition:  Discharge home once tolerating oral intake and cleared by Gastroenterology    All diagnosis and differential diagnosis have been reviewed; assessment and plan has been documented; I have personally reviewed the labs and test results that are presently available; I have reviewed the patients medication list; I have reviewed the consulting providers response and recommendations. I have reviewed or attempted to review medical records based upon their availability    All of the patient's questions have been  addressed and answered. Patient's is agreeable to the above stated plan. I will continue to monitor closely and make adjustments to medical management as needed.  _____________________________________________________________________    Nutrition Status:    Radiology:  SURG FL Surgery Fluoro Usage  See OP Notes for results.     IMPRESSION: See OP Notes for results.     This procedure was auto-finalized by: Margi  Radiologist      John Roger MD   06/20/2022

## 2022-06-21 NOTE — HPI
Initial History of Present Illness (HPI): 06/16/22  This is a 57 y.o. male presented to ER with dysphagia.   Dysphagia has been an issue since early May.  EGD 6/15/22: Extrinsic compression of the lower esophagus (bxed but not suspected to be significant findings). Dilated empirically to 50 fr. Moderate hemorrhogic gastritis, bxed. Moderate extrinsic compression of the duodenal sweep/bulb. Unable to get passed with standard gastroscope, exchanged for the colonoscopy and were able to eventually get through.  CT chest/abdomen wo contrast 6/14/22: Cicatrizing appearance of pancreas with multiloculated cystic structure extending from pancreas to GE junction within overall measurement of approximately 11 by 8 cm.  There is additional peripherally calcified cystic lesion adjacent to the small bowel within left upper quadrant measuring 7 cm.  Oral contrast is noted in stomach but is dense within the esophagus and findings likely represent partial obstruction at the GE junction.  Dr. Carlson is consulted for possible EUS evaluation and drainage.  He has asked Dr. Federico Goyal to evaluate as well.     Seen in past (2019) by Dr. Carlson for dysphagia.  EGD was ordered, scheduled several times but either rescheduled or cancelled and never accomplished.        Seen in 2010 by Dr. Panchito Trinh for gallstone pancreatitis  ERCP: 2010: Extrinsic compression of stomach. 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 Pancreatic sphincterotomy was accomplished.  He was sent to Dr. Dread Galloway at [place] for placement of a pancreatic stent . Stent was removed after patient clinically improved.  CT in 2011 revealed pancreatic pseudocysts. These were drained.  Was lost to follow up until 2019.    06/17/22 EUS:  Impression:            - Extrinsic compression in the lower third of the                          esophagus.                          - Extrinsic compression in the cardia.                           - Extrinsic compression in the prepyloric region                          of the stomach.                          - Extrinsic compression was noted in the                          gastroesophageal junction pancreatic pseudocyst.                          - Extrinsic compression was noted in the cardia of                          the stomach due to a pancreatic cyst/pseudocyst.                          Dilated.                          - Extrinsic compression was noted in the duodenal                          bulb and in the apex of the duodenal bulb.                          - Cystogastrostomy was performed.                          - Necrosectomy was performed.   Recommendation:        - Return patient to hospital fam for ongoing care.                          - Clear liquid diet for 3 days.                          - Continue present medications.                          - Avoid PPI & H2 blockers                          - 4-6 oz of carbonated beverages every 4 hrs while                          awake.                          - Levaquin (levofloxacin) 500 mg IV daily until                          discharge.                          - Perform an upper GI endoscopy with fluro on                          Monday to reassess cavity.                          - The findings and recommendations were discussed                          with the patient's family.     06/20/22 EGD with fluoro:  Impression:            - Normal esophagus.                          - Pre-existing gastric stent.                          - Duodenal deformity.                          - Necrosectomy was performed. The cavity has                          decreased in size considerably over the past 3                          days.   Recommendation:        - Return patient to hospital fam for ongoing care.                          - Full liquid diet today, then advance as                          tolerated to soft diet and low  residue diet until                          the stent is removed.                          - Continue present medications.                          - Observe patient's clinical course.                          - Repeat upper endoscopy in 2 weeks for stent                          removal.                          - The findings and recommendations were discussed                          with the patient.     06/21/22:  Lipase wnl.  Patient is doing well and in good spirits today. He is sitting on the edge of the bed doing word puzzles.  Patient tolerated clear and full liquid diets last night and this morning. He states he is not very hungry but he ate breakfast recently. Will progress to low residue solid diet.  Patient desires to go home since he is feeling much better.   Discussed pancreatitis risk factors and prevention with patient.

## 2022-06-21 NOTE — PROGRESS NOTES
[facility]  Gastroenterology  Progress Note    Patient Name: Tato Schmidt  MRN: [id number]  Admission Date: 6/14/2022  Hospital Length of Stay: 5 days  Code Status: Full Code   Attending Provider: John Roger MD  Consulting Provider: OLESYA Mckeon, Eric Carlson MD  Primary Care Physician: Daniel Vela MD  Principal Problem: Esophageal dysphagia      Subjective:     Interval History: Initial History of Present Illness (HPI): 06/16/22  This is a 57 y.o. male presented to ER with dysphagia.   Dysphagia has been an issue since early May.  EGD 6/15/22: Extrinsic compression of the lower esophagus (bxed but not suspected to be significant findings). Dilated empirically to 50 fr. Moderate hemorrhogic gastritis, bxed. Moderate extrinsic compression of the duodenal sweep/bulb. Unable to get passed with standard gastroscope, exchanged for the colonoscopy and were able to eventually get through.  CT chest/abdomen wo contrast 6/14/22: Cicatrizing appearance of pancreas with multiloculated cystic structure extending from pancreas to GE junction within overall measurement of approximately 11 by 8 cm.  There is additional peripherally calcified cystic lesion adjacent to the small bowel within left upper quadrant measuring 7 cm.  Oral contrast is noted in stomach but is dense within the esophagus and findings likely represent partial obstruction at the GE junction.  Dr. Carlson is consulted for possible EUS evaluation and drainage.  He has asked Dr. Federico Goyal to evaluate as well.     Seen in past (2019) by Dr. Carlson for dysphagia.  EGD was ordered, scheduled several times but either rescheduled or cancelled and never accomplished.        Seen in 2010 by Dr. Panchito Trinh for gallstone pancreatitis  ERCP: 2010: Extrinsic compression of stomach. 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 Pancreatic sphincterotomy was  accomplished.  He was sent to Dr. Dread Galloway at [place] for placement of a pancreatic stent . Stent was removed after patient clinically improved.  CT in 2011 revealed pancreatic pseudocysts. These were drained.  Was lost to follow up until 2019.    06/17/22 EUS:  Impression:            - Extrinsic compression in the lower third of the                          esophagus.                          - Extrinsic compression in the cardia.                          - Extrinsic compression in the prepyloric region                          of the stomach.                          - Extrinsic compression was noted in the                          gastroesophageal junction pancreatic pseudocyst.                          - Extrinsic compression was noted in the cardia of                          the stomach due to a pancreatic cyst/pseudocyst.                          Dilated.                          - Extrinsic compression was noted in the duodenal                          bulb and in the apex of the duodenal bulb.                          - Cystogastrostomy was performed.                          - Necrosectomy was performed.   Recommendation:        - Return patient to hospital fam for ongoing care.                          - Clear liquid diet for 3 days.                          - Continue present medications.                          - Avoid PPI & H2 blockers                          - 4-6 oz of carbonated beverages every 4 hrs while                          awake.                          - Levaquin (levofloxacin) 500 mg IV daily until                          discharge.                          - Perform an upper GI endoscopy with fluro on                          Monday to reassess cavity.                          - The findings and recommendations were discussed                          with the patient's family.     06/20/22 EGD with fluoro:  Impression:            - Normal esophagus.                           - Pre-existing gastric stent.                          - Duodenal deformity.                          - Necrosectomy was performed. The cavity has                          decreased in size considerably over the past 3                          days.   Recommendation:        - Return patient to hospital fam for ongoing care.                          - Full liquid diet today, then advance as                          tolerated to soft diet and low residue diet until                          the stent is removed.                          - Continue present medications.                          - Observe patient's clinical course.                          - Repeat upper endoscopy in 2 weeks for stent                          removal.                          - The findings and recommendations were discussed                          with the patient.     06/21/22:  Lipase wnl.  Patient is doing well and in good spirits today. He is sitting on the edge of the bed doing word puzzles.  Patient tolerated clear and full liquid diets last night and this morning. He states he is not very hungry but he ate breakfast recently. Will progress to low residue solid diet.  Patient desires to go home since he is feeling much better.   Discussed pancreatitis risk factors and prevention with patient.    Review of Systems   Constitutional:  Negative for chills, fever and unexpected weight change.   HENT:  Negative for mouth sores, sore throat and trouble swallowing.    Eyes:  Negative for discharge and visual disturbance.   Respiratory:  Negative for apnea, cough, shortness of breath and wheezing.    Cardiovascular:  Negative for chest pain, palpitations and leg swelling.   Gastrointestinal:  Negative for abdominal distention, abdominal pain, blood in stool, nausea and vomiting.        Denies heartburn, nausea, vomiting, dysphagia, globus sensation, hematemesis, epigastric pain.   Denies Abdominal pain, tenderness  Denies constipation,  diarrhea, BRBPR   Skin:  Negative for color change.   Neurological:  Negative for dizziness, syncope, weakness and headaches.   Psychiatric/Behavioral:  Negative for agitation, confusion and hallucinations.    Objective:     Vital Signs (Most Recent):  Temp: 97.8 °F (36.6 °C) (06/21/22 0807)  Pulse: 76 (06/21/22 0807)  Resp: 18 (06/21/22 0807)  BP: 103/66 (06/21/22 0807)  SpO2: 100 % (06/21/22 0807) Vital Signs (24h Range):  Temp:  [97 °F (36.1 °C)-98 °F (36.7 °C)] 97.8 °F (36.6 °C)  Pulse:  [63-76] 76  Resp:  [14-22] 18  SpO2:  [96 %-100 %] 100 %  BP: ()/(58-75) 103/66     Weight: 89.8 kg (198 lb) (06/20/22 1430)  Body mass index is 28.41 kg/m².      Intake/Output Summary (Last 24 hours) at 6/21/2022 1053  Last data filed at 6/21/2022 0500  Gross per 24 hour   Intake 860 ml   Output --   Net 860 ml       Lines/Drains/Airways       Peripheral Intravenous Line  Duration                  Peripheral IV - Single Lumen 06/17/22 2230 20 G Anterior;Distal;Left Upper Arm 3 days                    Physical Exam  Constitutional:       General: He is not in acute distress.     Appearance: Normal appearance. He is normal weight. He is not ill-appearing.   HENT:      Head: Normocephalic and atraumatic.      Right Ear: External ear normal.      Left Ear: External ear normal.      Nose: Nose normal.      Mouth/Throat:      Mouth: Mucous membranes are moist.   Eyes:      Conjunctiva/sclera: Conjunctivae normal.   Cardiovascular:      Rate and Rhythm: Normal rate and regular rhythm.      Heart sounds: Normal heart sounds.   Pulmonary:      Effort: Pulmonary effort is normal. No respiratory distress.      Breath sounds: Normal breath sounds. No stridor. No wheezing or rhonchi.   Abdominal:      General: Abdomen is flat. There is no distension.      Tenderness: There is no abdominal tenderness. There is no guarding.   Musculoskeletal:         General: Normal range of motion.      Cervical back: Normal range of motion.   Skin:      General: Skin is warm and dry.   Neurological:      Mental Status: He is alert and oriented to person, place, and time. Mental status is at baseline.   Psychiatric:         Mood and Affect: Mood normal.         Behavior: Behavior normal.         Thought Content: Thought content normal.       Significant Labs:  Recent Lab Results  (Last 5 results in the past 24 hours)        06/21/22  0522   06/21/22  0351   06/20/22  2056   06/20/22  1737   06/20/22  1432        Anion Gap   10.0             Baso #   0.05             Basophil %   1.3             BUN   7.3             BUN/CREAT RATIO   11             Calcium   9.0             Chloride   101             CO2   27             Creatinine   0.67             eGFR if    >60             Eos #   0.07             Eosinophil %   1.8             Glucose   158             Hematocrit   36.5             Hemoglobin   11.6             Immature Grans (Abs)   0.02             Immature Granulocytes   0.5             Lipase   8             Lymph #   0.66             LYMPH %   16.8             MCH   26.5             MCHC   31.8             MCV   83.5             Mono #   0.60             Mono %   15.2             MPV   11.8             Neut #   2.5             Neut %   64.4             nRBC   0.0             Platelets   191             POCT Glucose 129     181   116   108       Potassium   3.5             RBC   4.37             RDW   13.1             Sodium   138             WBC   3.9                                      Significant Imaging:  N/a    Assessment/Plan:     Cyst and pseudocyst of pancreas  Improved s/p stent placement  Patient instructed to avoid PPIs and H2 blockers  Patient instructed to drink carbonated beverages every 4 hours  Repeat EGD in 2 weeks outpatient for stent removal  Will f/u with Dr. Carlson  Will likely d/c tomorrow if he tolerates low residue diet        OLESYA Mckeon - acting as scribe for Eric Carlson MD  Gastroenterology  [facility]

## 2022-06-22 VITALS
BODY MASS INDEX: 28.35 KG/M2 | RESPIRATION RATE: 18 BRPM | DIASTOLIC BLOOD PRESSURE: 70 MMHG | TEMPERATURE: 98 F | OXYGEN SATURATION: 99 % | HEIGHT: 70 IN | HEART RATE: 86 BPM | SYSTOLIC BLOOD PRESSURE: 105 MMHG | WEIGHT: 198 LBS

## 2022-06-22 PROBLEM — R13.19 ESOPHAGEAL DYSPHAGIA: Chronic | Status: RESOLVED | Noted: 2022-06-15 | Resolved: 2022-06-22

## 2022-06-22 LAB — POCT GLUCOSE: 202 MG/DL (ref 70–110)

## 2022-06-22 PROCEDURE — 63600175 PHARM REV CODE 636 W HCPCS: Performed by: PHYSICIAN ASSISTANT

## 2022-06-22 PROCEDURE — 63600175 PHARM REV CODE 636 W HCPCS: Performed by: NURSE PRACTITIONER

## 2022-06-22 RX ORDER — CIPROFLOXACIN 500 MG/1
500 TABLET ORAL EVERY 12 HOURS
Qty: 42 TABLET | Refills: 0 | Status: SHIPPED | OUTPATIENT
Start: 2022-06-22 | End: 2022-07-13

## 2022-06-22 RX ADMIN — LEVOFLOXACIN 500 MG: 500 INJECTION, SOLUTION INTRAVENOUS at 09:06

## 2022-06-22 RX ADMIN — INSULIN ASPART 4 UNITS: 100 INJECTION, SOLUTION INTRAVENOUS; SUBCUTANEOUS at 11:06

## 2022-06-22 NOTE — DISCHARGE INSTRUCTIONS
1- Continue soft low residue diet    2- follow up with gastroenterology  3- return for any problems

## 2022-06-22 NOTE — DISCHARGE SUMMARY
[facility] Hospital Medicine Discharge Summary    Admit Date: 6/14/2022  Discharge Date and Time: 6/22/202211:28 AM  Admitting Physician: [unfilled]  Discharging Physician: John Roger MD.  Primary Care Physician: Daniel Vela MD  Consults: Gastroenterology    Discharge Diagnoses:  Pancreatic pseudocyst with external compression of distal esphagus/stomach  Dysphagia secondary to extrinsic compression    dm2- poorly controlled w a1c of 11.2    Hospital Course: Tato Schmidt is a 57-year-old  male with a past medical history of DM type II, gallstone pancreatitis s/p cholecystectomy in 2010,  pancreatic duct fistula and cyst with placement of a pancreatic stent s/p removal and LeVeen shunt in 2010, and chronic dysphagia previously followed by Dr. Trinh with Gastroenterology who presented to [facility] on 6/14/2022 with complaints of persistent dysphagia with an inability to tolerate oral intake and generalized weakness over the past 6 days. He states that he feels the food/liquids getting stuck in his throat when he eats which causes him to regurgitate. He denied any nausea, vomiting or abdominal pain ,able to tolerate his secretions. GI was consulted while he was in the ED who took him for an EGD on 06/14/2022   revealing extensive extrinsic compression of the esophagus. Later that evening he had a CT of the chest abdomen w cicatrizing appearance of the pancreas with multiloculated cystic structure extending from the pancreas to the GE junction within overall measurement of approximately 11 by 8 cm. There is additional peripherally calcified cystic lesion adjacent to the small bowel within the left upper quadrant measuring 7 cm.  Pt went for endoscopic drainage with axial stenting ,preferable given its minimally invasive nature.  Impression: - Extrinsic compression in the lower third of the   esophagus.   - Extrinsic compression in the cardia.   - Extrinsic  compression in the prepyloric region   of the stomach.   - Extrinsic compression was noted in the   gastroesophageal junction pancreatic pseudocyst.   - Extrinsic compression was noted in the cardia of   the stomach due to a pancreatic cyst/pseudocyst.   Dilated.   - Extrinsic compression was noted in the duodenal   bulb and in the apex of the duodenal bulb.   - Cystogastrostomy was performed.   - Necrosectomy was performed.     PPI/H2 blockers discontinued. Pt was allowed to drink carbonated drinks/liquid diet which he tolerated well. Days later pt returned  For EGD w flouro  Impression: - Normal esophagus.   - Pre-existing gastric stent.   - Duodenal deformity.   - Necrosectomy was performed. The cavity has   decreased in size considerably over the past 3   Days.    The day of discharge pt had a ct abd w/o contrast:   FINDINGS:  Small left and trace right pleural effusions.    Mild bibasilar atelectasis.  No acute findings noncontrast liver, spleen, adrenals or kidneys.  Gallbladder surgically absent.Interval placement of cyst gastrostomy.  The collection is much smaller measuring the 3-4 cm on image 16 series 2, previously up to 7 cm on a similar image.  No new peripancreatic collections are seen.  Stable appearance of the pancreas with sequela of chronic inflammation.No bowel obstruction.  Stable peripherally calcified left peritoneal collection image 40 series 2. No free air.  Similar scattered mesenteric lymph nodes.No significant ascites.  Abdominal aorta normal in caliber.  No retroperitoneal adenopathy.No acute osseous findings.   Impression:   1. The collection adjacent to the stomach is significantly smaller status post cyst gastrostomy.  2. No new suspicious findings.    Pt will be discharge on cipro 500 mgs po bid until stent removal. Continue soft low residue diet as well. CT scan of Abd with IV & Oral contrast 2 weeks from d/c followed by stent removal a few days later pending resolution of  cavity.                  Pt was seen and examined on the day of discharge  Vitals:  VITAL SIGNS: 24 HRS MIN & MAX LAST   Temp  Min: 97.5 °F (36.4 °C)  Max: 98.3 °F (36.8 °C) 97.5 °F (36.4 °C)   BP  Min: 96/62  Max: 113/72 106/70   Pulse  Min: 65  Max: 83  74   Resp  Min: 16  Max: 20 20   SpO2  Min: 54 %  Max: 99 % (!) 54 %       Physical Exam:   General: in no acute distress   HENT: normocephalic, atraumatic   Eye: PERRL, EOMI, clear conjunctiva   Neck: full ROM, no thyromegaly, no JVD   Respiratory: clear to auscultation bilaterally   Cardiovascular: regular rate and rhythm, no murmur   Gastrointestinal: non-distended, positive bowel sounds, non-tender   Musculoskeletal: no gross deformity   Integumentary: warm, dry, intact, no rashes   Neurological: cranial nerves grossly intact, no focal neurological deficit   Psychiatric: cooperative, flat affect, depressed        Procedures Performed: egd    Significant Diagnostic Studies: See Full reports for all details    Recent Labs   Lab 06/19/22  0408 06/21/22  0351   WBC 5.4 3.9*   RBC 4.67* 4.37*   HGB 12.3* 11.6*   HCT 39.2* 36.5*   MCV 83.9 83.5   MCH 26.3* 26.5*   MCHC 31.4* 31.8*   RDW 13.3 13.1    191   MPV 11.2 11.8       Recent Labs   Lab 06/18/22  0808 06/21/22  0351    138   K 3.7 3.5   CO2 26 27   BUN 9.1 7.3*   CREATININE 0.71* 0.67*   CALCIUM 9.1 9.0   ALBUMIN 3.3*  --    ALKPHOS 50  --    ALT <5  --    AST 13  --    BILITOT 1.2  --         Microbiology Results (last 7 days)     ** No results found for the last 168 hours. **           SURG FL Surgery Fluoro Usage  See OP Notes for results.     IMPRESSION: See OP Notes for results.     This procedure was auto-finalized by: Virtual Radiologist         Medication List      ASK your doctor about these medications    JARDIANCE 25 mg tablet  Generic drug: empagliflozin     metFORMIN 500 MG tablet  Commonly known as: GLUCOPHAGE     TRULICITY SUBQ             Explained in detail to the patient about the  discharge plan, medications, and follow-up visits. Pt understands and agrees with the treatment plan  Discharge Disposition: Home or Self Care   Discharged Condition: stable  Diet-   Dietary Orders (From admission, onward)     Start     Ordered    06/21/22 1800  Dietary nutrition supplements Boost Glucose Control Strawberry  3 times daily      Question:  Select PO Supplement:  Answer:  Boost Glucose Control Strawberry    06/21/22 1457    06/21/22 1048  Diet low fiber/residue  Diet effective now         06/21/22 1047               Medications Per DC med rec  Activities as tolerated   Follow-up Information     Panchito Trinh MD. Schedule an appointment as soon as possible for a visit .    Specialty: Gastroenterology  Why: OFFICE WILL CALL PATIENT ON FOLLOW UP APPT.  Contact information:  [address]  [facility] [address]  [phone]             Daniel Silverman MD. Schedule an appointment as soon as possible for a visit .    Specialty: Family Medicine  Why: FOLLOW UP APPT WITH DR SILVERMAN ON JUNE 26TH @ 11:00  Contact information:  [address]  [facility] [address]  [phone]                       For further questions contact hospitalist office    Discharge time 33 minutes    For worsening symptoms, chest pain, shortness of breath, increased abdominal pain, high grade fever, stroke or stroke like symptoms, immediately go to the nearest Emergency Room or call 911 as soon as possible.      John Paredes M.D, on 6/22/2022. at 11:28 AM.

## 2022-06-22 NOTE — PROGRESS NOTES
[facility]  Hospital Medicine Progress Note        Chief Complaint: Inpatient follow-up on esophageal dysphagia    HPI:   Tato Schmidt is a 57-year-old  male with a past medical history of DM type II, gallstone pancreatitis s/p cholecystectomy in 2010, a pancreatic duct fistula and cyst with placement of a pancreatic stent s/p removal and LeVeen shunt in 2010, and chronic dysphagia previously followed by Dr. Trinh with Gastroenterology who presented to [facility] on 6/14/2022 with complaints of persistent dysphagia with an inability to tolerate oral intake and generalized weakness over the past 6 days. He states that he feels the food/liquids getting stuck in his throat when he eats which causes him to regurgitate.  He denied any nausea, vomiting, or abdominal pain and is able to tolerate his secretions.  GI was consulted while he was in the ED who took him for an EGD on 06/14/2022 for a possible food bolus, but during the procedure he was noted to have an esophageal stricture which was dilated.  The information about the procedure was obtained from the ED physician. Hospital Medicine was consulted for admission given the patient's inability to tolerate oral intake and GI will follow along.     Of note, he was seen in the ED on 05/08/2022 with similar symptoms but was able to tolerate oral intake after receiving IV glucagon and famotidine while in the ED so he was discharged home.     His vital signs are currently stable.  His lab showed a bicarbonate of 19, otherwise they were mostly unremarkable.      Interval Hx:   6/15/22-Patient is boarding in the emergency room .  Currently sleeping soundly .  No acute issues reported.  He remains NPO.  The patient had an EGD yesterday revealing extensive compression of the esophagus.  Later that evening he had a CT of the chest abdomen without contrast revealing a pancreas with a multiloculated 6 cystic structure extending from  the pancreas to the GE junction with overall measurement of approximately 11 cm x 8 cm.  Findings likely represent partial obstruction at the GE junction.    6/16/22 plans for EUS drainage if cystic peripancreatic fluid collection causing extrinsic compression of distal esophagus/GE junction/stomach causing dysphagia.    6/17/22 no complains . On his way to EUS for drainage of  Cystic pancreatic lesion     6/18/22 no complains. Feeling better    6/19/22- pt for egd tomorrow /voices no complains other than he is hungry.    6/20/22 post EGD w/ flouro  To verify  Axial stenting patency/ draining(awaiting procedure report)     EGD with fluoro:   Impression: - Normal esophagus.   - Pre-existing gastric stent.   - Duodenal deformity.   - Necrosectomy was performed. The cavity has   decreased in size considerably over the past 3   days.   Recommendation: - Return patient to hospital fam for ongoing care.   - Full liquid diet today, then advance as   tolerated to soft diet and low residue diet until   the stent is removed.   - Continue present medications.   - Observe patient's clinical course.   - Repeat upper endoscopy in 2 weeks for stent   removal.   - The findings and recommendations were discussed   with the patient.         6-21-22 tolerating diet w/o problems. Voices no complains and feels fine. Will have ct abdomen tomorrow before discharge      Objective/physical exam:  General: in no acute distress  HENT: normocephalic, atraumatic  Eye: PERRL, EOMI, clear conjunctiva  Neck: full ROM, no thyromegaly, no JVD  Respiratory: clear to auscultation bilaterally  Cardiovascular: regular rate and rhythm, no murmur  Gastrointestinal: non-distended, positive bowel sounds, non-tender  Musculoskeletal: no gross deformity  Integumentary: warm, dry, intact, no rashes  Neurological: cranial nerves grossly intact, no focal neurological deficit  Psychiatric: cooperative, flat affect, depressed      VITAL SIGNS: 24 HRS MIN & MAX  LAST   Temp  Min: 97.5 °F (36.4 °C)  Max: 98.3 °F (36.8 °C) 98.3 °F (36.8 °C)   BP  Min: 96/62  Max: 112/73 96/62   Pulse  Min: 67  Max: 83  79   Resp  Min: 16  Max: 18 16   SpO2  Min: 97 %  Max: 100 % 99 %       Recent Labs   Lab 06/15/22  0356 06/19/22  0408 06/21/22  0351   WBC 4.6 5.4 3.9*   RBC 4.84 4.67* 4.37*   HGB 13.0* 12.3* 11.6*   HCT 40.4* 39.2* 36.5*   MCV 83.5 83.9 83.5   MCH 26.9* 26.3* 26.5*   MCHC 32.2* 31.4* 31.8*   RDW 13.3 13.3 13.1    143 191   MPV 11.7 11.2 11.8       Recent Labs   Lab 06/15/22  0356 06/18/22  0808 06/21/22  0351    140 138   K 4.3 3.7 3.5   CO2 21* 26 27   BUN 13.4 9.1 7.3*   CREATININE 0.79 0.71* 0.67*   CALCIUM 9.4 9.1 9.0   ALBUMIN 3.3* 3.3*  --    ALKPHOS 54 50  --    ALT 7 <5  --    AST 15 13  --    BILITOT 0.9 1.2  --           Microbiology Results (last 7 days)     ** No results found for the last 168 hours. **           See below for Radiology    Scheduled Med:   levoFLOXacin  500 mg Intravenous Q24H    LIDOcaine (PF) 10 mg/ml (1%)  1 mL Intradermal Once        Continuous Infusions:   electrolyte-A Stopped (06/20/22 1530)    lactated ringers 125 mL/hr at 06/17/22 0336        PRN Meds:  acetaminophen, dextrose 50%, dextrose 50%, glucagon (human recombinant), insulin aspart U-100, ondansetron       Assessment/Plan:  Esophageal dysphagia due to extrinsic compression due to large   Multiloculated cystic structure extending from the tail of the pancreas to the GE  -EUS post axial stent, egd repeated today  w flouro to verify patency/draining. Await further recs by GI  -no ppi/h2  -allow carbonated drinks  -ct abd in am before discharge      DM2- A1C 11.8, uncontrolled    elevated lipase      Plan- ct abd w/ocontrast    Patient condition:  Stable    Anticipated discharge and Disposition:  Discharge home once tolerating oral intake and cleared by Gastroenterology    All diagnosis and differential diagnosis have been reviewed; assessment and plan has been  documented; I have personally reviewed the labs and test results that are presently available; I have reviewed the patients medication list; I have reviewed the consulting providers response and recommendations. I have reviewed or attempted to review medical records based upon their availability    All of the patient's questions have been  addressed and answered. Patient's is agreeable to the above stated plan. I will continue to monitor closely and make adjustments to medical management as needed.  _____________________________________________________________________    Nutrition Status:    Radiology:  SURG FL Surgery Fluoro Usage  See OP Notes for results.     IMPRESSION: See OP Notes for results.     This procedure was auto-finalized by: Virtual Radiologist      John Roger MD   06/21/2022

## 2022-06-23 ENCOUNTER — PATIENT OUTREACH (OUTPATIENT)
Dept: ADMINISTRATIVE | Facility: CLINIC | Age: 57
End: 2022-06-23
Payer: COMMERCIAL

## 2022-06-23 NOTE — PROGRESS NOTES
C3 nurse spoke with Tato Schmidt for a TCC post hospital discharge follow up call. The patient has a scheduled HOSFU appointment with Daniel Vela MD   on 6/26/22 @ 11:00a.

## 2022-06-27 DIAGNOSIS — K86.3 CYST AND PSEUDOCYST OF PANCREAS: Primary | ICD-10-CM

## 2022-06-27 DIAGNOSIS — K86.2 CYST AND PSEUDOCYST OF PANCREAS: Primary | ICD-10-CM

## 2022-07-08 ENCOUNTER — HOSPITAL ENCOUNTER (OUTPATIENT)
Facility: HOSPITAL | Age: 57
Discharge: HOME | End: 2022-07-08
Attending: INTERNAL MEDICINE | Admitting: INTERNAL MEDICINE
Payer: COMMERCIAL

## 2022-07-08 ENCOUNTER — ANESTHESIA (OUTPATIENT)
Dept: ENDOSCOPY | Facility: HOSPITAL | Age: 57
End: 2022-07-08
Payer: COMMERCIAL

## 2022-07-08 ENCOUNTER — ANESTHESIA EVENT (OUTPATIENT)
Dept: ENDOSCOPY | Facility: HOSPITAL | Age: 57
End: 2022-07-08
Payer: COMMERCIAL

## 2022-07-08 VITALS
DIASTOLIC BLOOD PRESSURE: 84 MMHG | TEMPERATURE: 98 F | WEIGHT: 198 LBS | RESPIRATION RATE: 16 BRPM | HEART RATE: 69 BPM | SYSTOLIC BLOOD PRESSURE: 109 MMHG | HEIGHT: 70 IN | OXYGEN SATURATION: 98 % | BODY MASS INDEX: 28.35 KG/M2

## 2022-07-08 DIAGNOSIS — K86.3 PANCREATIC PSEUDOCYST: ICD-10-CM

## 2022-07-08 LAB — POCT GLUCOSE: 112 MG/DL (ref 70–110)

## 2022-07-08 PROCEDURE — 43239 EGD BIOPSY SINGLE/MULTIPLE: CPT | Mod: 59 | Performed by: INTERNAL MEDICINE

## 2022-07-08 PROCEDURE — 27201423 OPTIME MED/SURG SUP & DEVICES STERILE SUPPLY: Performed by: INTERNAL MEDICINE

## 2022-07-08 PROCEDURE — 37000008 HC ANESTHESIA 1ST 15 MINUTES: Performed by: INTERNAL MEDICINE

## 2022-07-08 PROCEDURE — 25000003 PHARM REV CODE 250: Performed by: NURSE ANESTHETIST, CERTIFIED REGISTERED

## 2022-07-08 PROCEDURE — 37000009 HC ANESTHESIA EA ADD 15 MINS: Performed by: INTERNAL MEDICINE

## 2022-07-08 PROCEDURE — 63600175 PHARM REV CODE 636 W HCPCS: Performed by: NURSE ANESTHETIST, CERTIFIED REGISTERED

## 2022-07-08 PROCEDURE — 43247 EGD REMOVE FOREIGN BODY: CPT | Performed by: INTERNAL MEDICINE

## 2022-07-08 RX ORDER — LIDOCAINE HYDROCHLORIDE 10 MG/ML
1 INJECTION, SOLUTION EPIDURAL; INFILTRATION; INTRACAUDAL; PERINEURAL ONCE
Status: DISCONTINUED | OUTPATIENT
Start: 2022-07-08 | End: 2022-07-08

## 2022-07-08 RX ORDER — DIPHENHYDRAMINE HYDROCHLORIDE 50 MG/ML
25 INJECTION INTRAMUSCULAR; INTRAVENOUS ONCE
Status: DISCONTINUED | OUTPATIENT
Start: 2022-07-08 | End: 2022-07-08 | Stop reason: HOSPADM

## 2022-07-08 RX ORDER — HYDROCODONE BITARTRATE AND ACETAMINOPHEN 5; 325 MG/1; MG/1
1 TABLET ORAL EVERY 4 HOURS PRN
Status: DISCONTINUED | OUTPATIENT
Start: 2022-07-08 | End: 2022-07-08

## 2022-07-08 RX ORDER — LIDOCAINE HYDROCHLORIDE 20 MG/ML
INJECTION, SOLUTION INFILTRATION; PERINEURAL
Status: DISCONTINUED
Start: 2022-07-08 | End: 2022-07-08 | Stop reason: HOSPADM

## 2022-07-08 RX ORDER — SODIUM CHLORIDE, SODIUM GLUCONATE, SODIUM ACETATE, POTASSIUM CHLORIDE AND MAGNESIUM CHLORIDE 30; 37; 368; 526; 502 MG/100ML; MG/100ML; MG/100ML; MG/100ML; MG/100ML
1000 INJECTION, SOLUTION INTRAVENOUS CONTINUOUS
Status: DISCONTINUED | OUTPATIENT
Start: 2022-07-08 | End: 2022-07-08 | Stop reason: HOSPADM

## 2022-07-08 RX ORDER — SODIUM CHLORIDE, SODIUM GLUCONATE, SODIUM ACETATE, POTASSIUM CHLORIDE AND MAGNESIUM CHLORIDE 30; 37; 368; 526; 502 MG/100ML; MG/100ML; MG/100ML; MG/100ML; MG/100ML
1000 INJECTION, SOLUTION INTRAVENOUS CONTINUOUS
Status: DISCONTINUED | OUTPATIENT
Start: 2022-07-08 | End: 2022-07-08

## 2022-07-08 RX ORDER — ONDANSETRON 4 MG/1
4 TABLET, ORALLY DISINTEGRATING ORAL ONCE
Status: DISCONTINUED | OUTPATIENT
Start: 2022-07-08 | End: 2022-07-08

## 2022-07-08 RX ORDER — PROPOFOL 10 MG/ML
INJECTION, EMULSION INTRAVENOUS
Status: COMPLETED
Start: 2022-07-08 | End: 2022-07-08

## 2022-07-08 RX ORDER — ONDANSETRON 2 MG/ML
4 INJECTION INTRAMUSCULAR; INTRAVENOUS DAILY PRN
Status: DISCONTINUED | OUTPATIENT
Start: 2022-07-08 | End: 2022-07-08 | Stop reason: HOSPADM

## 2022-07-08 RX ORDER — ACETAMINOPHEN 325 MG/1
650 TABLET ORAL EVERY 4 HOURS PRN
Status: DISCONTINUED | OUTPATIENT
Start: 2022-07-08 | End: 2022-07-08

## 2022-07-08 RX ORDER — HYDROMORPHONE HYDROCHLORIDE 2 MG/ML
0.2 INJECTION, SOLUTION INTRAMUSCULAR; INTRAVENOUS; SUBCUTANEOUS EVERY 5 MIN PRN
Status: DISCONTINUED | OUTPATIENT
Start: 2022-07-08 | End: 2022-07-08 | Stop reason: HOSPADM

## 2022-07-08 RX ORDER — MIDAZOLAM HYDROCHLORIDE 1 MG/ML
2 INJECTION INTRAMUSCULAR; INTRAVENOUS ONCE AS NEEDED
Status: DISCONTINUED | OUTPATIENT
Start: 2022-07-08 | End: 2022-07-08

## 2022-07-08 RX ORDER — SODIUM CHLORIDE 0.9 % (FLUSH) 0.9 %
2 SYRINGE (ML) INJECTION ONCE
Status: DISCONTINUED | OUTPATIENT
Start: 2022-07-08 | End: 2022-07-08 | Stop reason: HOSPADM

## 2022-07-08 RX ORDER — LIDOCAINE HCL/PF 100 MG/5ML
SYRINGE (ML) INTRAVENOUS
Status: DISCONTINUED | OUTPATIENT
Start: 2022-07-08 | End: 2022-07-08

## 2022-07-08 RX ORDER — PROPOFOL 10 MG/ML
INJECTION, EMULSION INTRAVENOUS CONTINUOUS PRN
Status: DISCONTINUED | OUTPATIENT
Start: 2022-07-08 | End: 2022-07-08

## 2022-07-08 RX ORDER — MEPERIDINE HYDROCHLORIDE 25 MG/ML
12.5 INJECTION INTRAMUSCULAR; INTRAVENOUS; SUBCUTANEOUS ONCE
Status: DISCONTINUED | OUTPATIENT
Start: 2022-07-08 | End: 2022-07-08 | Stop reason: HOSPADM

## 2022-07-08 RX ADMIN — PROPOFOL 275 MCG/KG/MIN: 10 INJECTION, EMULSION INTRAVENOUS at 01:07

## 2022-07-08 RX ADMIN — LIDOCAINE HYDROCHLORIDE 40 MG: 20 INJECTION, SOLUTION INTRAVENOUS at 01:07

## 2022-07-08 NOTE — ANESTHESIA POSTPROCEDURE EVALUATION
Anesthesia Post Evaluation    Patient: Tato Schmidt    Procedure(s) Performed: Procedure(s) (LRB):  EGD w/ fluro (N/A)  EGD, WITH FOREIGN BODY REMOVAL  EGD, WITH CLOSED BIOPSY    Final Anesthesia Type: general      Patient location during evaluation: PACU  Patient participation: Yes- Able to Participate  Level of consciousness: awake and alert and oriented  Post-procedure vital signs: reviewed and stable  Pain management: adequate  Airway patency: patent  PHOEBE mitigation strategies: Verification of full reversal of neuromuscular block  PONV status at discharge: No PONV  Anesthetic complications: no      Cardiovascular status: blood pressure returned to baseline and stable  Respiratory status: spontaneous ventilation and unassisted  Hydration status: euvolemic  Follow-up not needed.  Comments: [facility]          Vitals Value Taken Time   BP 99/67 07/08/22 1341     07/08/22 1351   Pulse 80 07/08/22 1341   Resp 20 07/08/22 1341   SpO2 97 % 07/08/22 1341         No case tracking events are documented in the log.      Pain/Carrillo Score: Carrillo Score: 9 (7/8/2022  1:41 PM)

## 2022-07-08 NOTE — PROVATION PATIENT INSTRUCTIONS
Discharge Summary/Instructions after an Endoscopic Procedure  Patient Name: Tato Schmdit  Patient MRN: [id number]  Patient YOB: 1965 Friday, July 8, 2022  Eric Carlson MD  Dear patient,  As a result of recent federal legislation (The Federal Cures Act), you may   receive lab or pathology results from your procedure in your [facility]   account before your physician is able to contact you. Your physician or   their representative will relay the results to you with their   recommendations at their soonest availability.  Thank you,  RESTRICTIONS:  During your procedure today, you received medications for sedation.  These   medications may affect your judgment, balance and coordination.  Therefore,   for 24 hours, you have the following restrictions:   - DO NOT drive a car, operate machinery, make legal/financial decisions,   sign important papers or drink alcohol.    ACTIVITY:  Today: no heavy lifting, straining or running due to procedural   sedation/anesthesia.  The following day: return to full activity including work.  DIET:  Eat and drink normally unless instructed otherwise.     TREATMENT FOR COMMON SIDE EFFECTS:  - Mild abdominal pain, nausea, belching, bloating or excessive gas:  rest,   eat lightly and use a heating pad.  - Sore Throat: treat with throat lozenges and/or gargle with warm salt   water.  - Because air was used during the procedure, expelling large amounts of air   from your rectum or belching is normal.  - If a bowel prep was taken, you may not have a bowel movement for 1-3 days.    This is normal.  SYMPTOMS TO WATCH FOR AND REPORT TO YOUR PHYSICIAN:  1. Abdominal pain or bloating, other than gas cramps.  2. Chest pain.  3. Back pain.  4. Signs of infection such as: chills or fever occurring within 24 hours   after the procedure.  5. Rectal bleeding, which would show as bright red, maroon, or black stools.   (A tablespoon of blood from the rectum is not serious, especially if    hemorrhoids are present.)  6. Vomiting.  7. Weakness or dizziness.  GO DIRECTLY TO THE NEAREST EMERGENCY ROOM IF YOU HAVE ANY OF THE FOLLOWING:      Difficulty breathing              Chills and/or fever over 101 F   Persistent vomiting and/or vomiting blood   Severe abdominal pain   Severe chest pain   Black, tarry stools   Bleeding- more than one tablespoon   Any other symptom or condition that you feel may need urgent attention  Your doctor recommends these additional instructions:  If any biopsies were taken, your doctors clinic will contact you in 1 to 2   weeks with any results.  - Discharge patient to home (with escort).   - Soft diet for 1 week, then advance as tolerated to resume previous diet.   - Continue present medications.   - Cipro (ciprofloxacin) 500 mg PO BID for 5 days.   - Observe patient's clinical course.   - Await pathology results.   - Return to my office in 2 months.   - The findings and recommendations were discussed with the patient.  For questions, problems or results please call your physician - Eric Carlson MD at Work:  [phone].  [facility], EMERGENCY ROOM PHONE NUMBER: [phone]  IF A COMPLICATION OR EMERGENCY SITUATION ARISES AND YOU ARE UNABLE TO REACH   YOUR PHYSICIAN - GO DIRECTLY TO THE EMERGENCY ROOM.  Eric Carlson MD  7/8/2022 1:42:40 PM  This report has been verified and signed electronically.  Dear patient,  As a result of recent federal legislation (The Federal Cures Act), you may   receive lab or pathology results from your procedure in your [facility]   account before your physician is able to contact you. Your physician or   their representative will relay the results to you with their   recommendations at their soonest availability.  Thank you,  PROVATION

## 2022-07-08 NOTE — TRANSFER OF CARE
"Anesthesia Transfer of Care Note    Patient: Tato Schmidt    Procedure(s) Performed: Procedure(s) (LRB):  EGD w/ fluro (N/A)  EGD, WITH FOREIGN BODY REMOVAL  EGD, WITH CLOSED BIOPSY    Patient location: GI    Anesthesia Type: general    Transport from OR: Transported from OR on room air with adequate spontaneous ventilation    Post pain: adequate analgesia    Post assessment: no apparent anesthetic complications    Post vital signs: stable    Level of consciousness: responds to stimulation    Nausea/Vomiting: no nausea/vomiting    Complications: none    Transfer of care protocol was followed      Last vitals:   Visit Vitals  /65 (BP Location: Left arm, Patient Position: Lying)   Pulse 80   Temp 36.4 °C (97.5 °F) (Tympanic)   Resp 20   Ht 5' 10" (1.778 m)   Wt 89.8 kg (198 lb)   SpO2 100%   BMI 28.41 kg/m²     "

## 2022-07-08 NOTE — ANESTHESIA PREPROCEDURE EVALUATION
07/08/2022  Tato Schmidt is a 57 y.o., male.    EGD w/ fluro (N/A Abdomen)  In [facility] ENDO     Pre-op Assessment    I have reviewed the Patient Summary Reports.     I have reviewed the Nursing Notes. I have reviewed the NPO Status.   I have reviewed the Medications.     Review of Systems  Anesthesia Hx:  No problems with previous Anesthesia    Hematology/Oncology:  Hematology Normal   Oncology Normal     EENT/Dental:EENT/Dental Normal   Cardiovascular:  Cardiovascular Normal Exercise tolerance: good   Functional Capacity good / => 4 METS    Pulmonary:  Pulmonary Normal    Renal/:   Denies Chronic Renal Disease.     Hepatic/GI:  Hepatic/GI Normal    Musculoskeletal:  Musculoskeletal Normal    Neurological:  Neurology Normal    Endocrine:   Diabetes  Denies Morbid Obesity / BMI > 40  Dermatological:  Skin Normal    Psych:  Psychiatric Normal           Physical Exam  General: Alert, Oriented, Well nourished and Cooperative    Airway:  Mallampati: II   Mouth Opening: Normal  TM Distance: Normal  Tongue: Normal  Neck ROM: Normal ROM    Dental:  Intact    Chest/Lungs:  Clear to auscultation, Normal Respiratory Rate    Heart:  Rate: Normal  Rhythm: Regular Rhythm        Anesthesia Plan  Type of Anesthesia, risks & benefits discussed:    Anesthesia Type: Gen Natural Airway  Intra-op Monitoring Plan: Standard ASA Monitors  Post Op Pain Control Plan: multimodal analgesia  Induction:  IV  Airway Plan: Direct  Informed Consent: Informed consent signed with the Patient and all parties understand the risks and agree with anesthesia plan.  All questions answered. Patient consented to blood products? Yes  ASA Score: 2  Day of Surgery Review of History & Physical: H&P Update referred to the surgeon/provider.    Ready For Surgery From Anesthesia Perspective.     .

## 2022-07-12 LAB
ESTROGEN SERPL-MCNC: NORMAL PG/ML
INSULIN SERPL-ACNC: NORMAL U[IU]/ML
LAB AP CLINICAL INFORMATION: NORMAL
LAB AP GROSS DESCRIPTION: NORMAL
LAB AP REPORT FOOTNOTES: NORMAL
T3RU NFR SERPL: NORMAL %

## 2022-09-21 ENCOUNTER — HISTORICAL (OUTPATIENT)
Dept: ADMINISTRATIVE | Facility: HOSPITAL | Age: 57
End: 2022-09-21
Payer: COMMERCIAL

## (undated) DEVICE — BITE BLOCK ADULT LATEX FREE

## (undated) DEVICE — KIT CANIST SUCTION 1200CC

## (undated) DEVICE — SOL IRRI STRL WATER 1000ML

## (undated) DEVICE — DILATOR CRE 10-12MM

## (undated) DEVICE — KIT SURGICAL COLON .25 1.1OZ

## (undated) DEVICE — BAG LABGUARD BIOHAZARD 6X9IN

## (undated) DEVICE — COLLECTION SPECIMEN NEPTUNE

## (undated) DEVICE — TUBING O2 FEMALE CONN 13FT

## (undated) DEVICE — FORCEP BX LG CAP 2.8MMX240CM

## (undated) DEVICE — UNDERPAD DISPOSABLE 30X30IN

## (undated) DEVICE — FORCEP ALLIGATOR 2.8MM W/NDL

## (undated) DEVICE — TIP SUCTION YANKAUER

## (undated) DEVICE — GUIDEWIRE JAGWIRE .035X450CM

## (undated) DEVICE — INFLATOR ADVANTAGE ENCORE 26

## (undated) DEVICE — BLLN ULTRASONIC LINEAR FLEX

## (undated) DEVICE — DEVICE GRASPING RAPTOR

## (undated) DEVICE — Device

## (undated) DEVICE — CONTAINER SPECIMEN SCREW 4OZ

## (undated) DEVICE — PAD ELECTRODE

## (undated) DEVICE — CATH CUFF BLLN US RADIAL FLEX

## (undated) DEVICE — CONTAINER SPECIMEN 4OZ